# Patient Record
Sex: MALE | Race: WHITE | NOT HISPANIC OR LATINO | Employment: FULL TIME | URBAN - METROPOLITAN AREA
[De-identification: names, ages, dates, MRNs, and addresses within clinical notes are randomized per-mention and may not be internally consistent; named-entity substitution may affect disease eponyms.]

---

## 2023-04-03 ENCOUNTER — OFFICE VISIT (OUTPATIENT)
Dept: PRIMARY CARE | Facility: CLINIC | Age: 33
End: 2023-04-03
Payer: COMMERCIAL

## 2023-04-03 VITALS
SYSTOLIC BLOOD PRESSURE: 132 MMHG | BODY MASS INDEX: 26.2 KG/M2 | DIASTOLIC BLOOD PRESSURE: 74 MMHG | WEIGHT: 183 LBS | TEMPERATURE: 98.6 F | HEIGHT: 70 IN | OXYGEN SATURATION: 97 % | HEART RATE: 91 BPM

## 2023-04-03 DIAGNOSIS — F41.9 ANXIETY: ICD-10-CM

## 2023-04-03 DIAGNOSIS — M94.0 COSTOCHONDRITIS: Primary | ICD-10-CM

## 2023-04-03 PROBLEM — R07.82 INTERCOSTAL PAIN: Status: ACTIVE | Noted: 2023-04-03

## 2023-04-03 PROCEDURE — 99204 OFFICE O/P NEW MOD 45 MIN: CPT | Performed by: STUDENT IN AN ORGANIZED HEALTH CARE EDUCATION/TRAINING PROGRAM

## 2023-04-03 RX ORDER — CYCLOBENZAPRINE HCL 5 MG
5 TABLET ORAL NIGHTLY PRN
Qty: 30 TABLET | Refills: 0 | Status: SHIPPED | OUTPATIENT
Start: 2023-04-03 | End: 2023-04-20 | Stop reason: SDUPTHER

## 2023-04-03 RX ORDER — IBUPROFEN 600 MG/1
TABLET ORAL
COMMUNITY
Start: 2022-08-18 | End: 2023-06-20 | Stop reason: ALTCHOICE

## 2023-04-03 RX ORDER — SERTRALINE HYDROCHLORIDE 25 MG/1
25 TABLET, FILM COATED ORAL DAILY
Qty: 30 TABLET | Refills: 1 | Status: SHIPPED | OUTPATIENT
Start: 2023-04-03 | End: 2023-04-20 | Stop reason: SDUPTHER

## 2023-04-03 SDOH — ECONOMIC STABILITY: TRANSPORTATION INSECURITY
IN THE PAST 12 MONTHS, HAS THE LACK OF TRANSPORTATION KEPT YOU FROM MEDICAL APPOINTMENTS OR FROM GETTING MEDICATIONS?: NO

## 2023-04-03 SDOH — HEALTH STABILITY: PHYSICAL HEALTH: ON AVERAGE, HOW MANY DAYS PER WEEK DO YOU ENGAGE IN MODERATE TO STRENUOUS EXERCISE (LIKE A BRISK WALK)?: 0 DAYS

## 2023-04-03 SDOH — ECONOMIC STABILITY: TRANSPORTATION INSECURITY
IN THE PAST 12 MONTHS, HAS LACK OF TRANSPORTATION KEPT YOU FROM MEETINGS, WORK, OR FROM GETTING THINGS NEEDED FOR DAILY LIVING?: NO

## 2023-04-03 SDOH — HEALTH STABILITY: PHYSICAL HEALTH: ON AVERAGE, HOW MANY MINUTES DO YOU ENGAGE IN EXERCISE AT THIS LEVEL?: 0 MIN

## 2023-04-03 ASSESSMENT — SOCIAL DETERMINANTS OF HEALTH (SDOH)
WITHIN THE LAST YEAR, HAVE YOU BEEN AFRAID OF YOUR PARTNER OR EX-PARTNER?: NO
WITHIN THE LAST YEAR, HAVE YOU BEEN HUMILIATED OR EMOTIONALLY ABUSED IN OTHER WAYS BY YOUR PARTNER OR EX-PARTNER?: NO
ARE YOU MARRIED, WIDOWED, DIVORCED, SEPARATED, NEVER MARRIED, OR LIVING WITH A PARTNER?: NEVER MARRIED
IN A TYPICAL WEEK, HOW MANY TIMES DO YOU TALK ON THE PHONE WITH FAMILY, FRIENDS, OR NEIGHBORS?: MORE THAN THREE TIMES A WEEK
WITHIN THE LAST YEAR, HAVE TO BEEN RAPED OR FORCED TO HAVE ANY KIND OF SEXUAL ACTIVITY BY YOUR PARTNER OR EX-PARTNER?: NO
WITHIN THE LAST YEAR, HAVE YOU BEEN KICKED, HIT, SLAPPED, OR OTHERWISE PHYSICALLY HURT BY YOUR PARTNER OR EX-PARTNER?: NO
DO YOU BELONG TO ANY CLUBS OR ORGANIZATIONS SUCH AS CHURCH GROUPS UNIONS, FRATERNAL OR ATHLETIC GROUPS, OR SCHOOL GROUPS?: NO
HOW OFTEN DO YOU ATTEND CHURCH OR RELIGIOUS SERVICES?: NEVER
HOW OFTEN DO YOU ATTENT MEETINGS OF THE CLUB OR ORGANIZATION YOU BELONG TO?: NEVER
HOW OFTEN DO YOU GET TOGETHER WITH FRIENDS OR RELATIVES?: THREE TIMES A WEEK

## 2023-04-03 ASSESSMENT — ENCOUNTER SYMPTOMS
NECK PAIN: 0
COUGH: 0
SINUS PRESSURE: 0
MYALGIAS: 1
NECK STIFFNESS: 0
JOINT SWELLING: 0
SHORTNESS OF BREATH: 0
DIAPHORESIS: 0
FEVER: 0
HEADACHES: 0
NAUSEA: 0
DIFFICULTY URINATING: 0
PALPITATIONS: 0
NUMBNESS: 0
APNEA: 0
UNEXPECTED WEIGHT CHANGE: 0
FATIGUE: 0
DIZZINESS: 0
SINUS PAIN: 0
VOMITING: 0
CHEST TIGHTNESS: 0
DYSURIA: 0
LIGHT-HEADEDNESS: 0
BLOOD IN STOOL: 0
DIARRHEA: 0

## 2023-04-03 NOTE — PROGRESS NOTES
Subjective   Patient ID: Thad Pardo is a 32 y.o. male who presents for Chest Pain (Patient has been having chest discomfort for about a year. Muscle spasm to chest, left arm and back area. /Seen Cardiologist earlier in the year, had heart ultrasound, stress test, and ct angiogram done results were negative. Goes back to see Dr Rudolph on 04/05/2023.) and Establish Care.    HPI   32 year old male presenting for evaluation of chest pain.    Current symptoms: Left sided chest pain and central chest soreness. Describes it as ripping/tearing with associated burning. Spasms within chest wall, bilateral.    Symptom onset: 1 year  Duration: Always present but during exacerbations lasts for several minutes.    Exacerbated by: Physical activity, sleeping on side, coughing, sneezing, laughing, stretching.  Symptoms present within a few minutes of physical activity. Sometimes the pain is so severe he feels short of breath.    Management: Ice, hot soaks, tylenol/ibuprofen, rest. Recently received a pill for his pain from his cardiologist to take for two weeks but does not remember the name. No resolution of symptoms.    No prior injury history including falls, MVA. Did play contact sports in childhood but no significant injuries.    Symptoms have affected his ability to do physical activities such as running and exercise. Also limits his ability to play with his son.    Has had extensive cardiology evaluation without abnormalities.  No family history of cardiac disease.    Denies dizziness, lightheadedness, syncope. No diaphoresis during exacerbations.    Was previously treated with hydroxyzine as it was believed his symptoms were related to anxiety. Is not currently being treated for anxiety.    Review of Systems   Constitutional:  Negative for diaphoresis, fatigue, fever and unexpected weight change.   HENT:  Negative for sinus pressure and sinus pain.    Eyes:  Negative for visual disturbance.   Respiratory:  Negative  "for apnea, cough, chest tightness and shortness of breath.    Cardiovascular:  Positive for chest pain. Negative for palpitations.   Gastrointestinal:  Negative for blood in stool, diarrhea, nausea and vomiting.   Genitourinary:  Negative for difficulty urinating and dysuria.   Musculoskeletal:  Positive for myalgias. Negative for joint swelling, neck pain and neck stiffness.   Skin:  Negative for rash.   Neurological:  Negative for dizziness, syncope, light-headedness, numbness and headaches.   All other systems reviewed and are negative.      Objective   /74 (BP Location: Left arm, Patient Position: Sitting, BP Cuff Size: Adult)   Pulse 91   Temp 37 °C (98.6 °F) (Temporal)   Ht 1.778 m (5' 10\")   Wt 83 kg (183 lb)   SpO2 97%   BMI 26.26 kg/m²     Physical Exam  Vitals and nursing note reviewed.   Constitutional:       General: He is not in acute distress.     Appearance: Normal appearance. He is not toxic-appearing.   Eyes:      Conjunctiva/sclera: Conjunctivae normal.      Pupils: Pupils are equal, round, and reactive to light.   Cardiovascular:      Rate and Rhythm: Normal rate and regular rhythm.      Pulses: Normal pulses.      Heart sounds: Normal heart sounds.   Pulmonary:      Effort: Pulmonary effort is normal. No respiratory distress.      Breath sounds: Normal breath sounds. No wheezing.   Musculoskeletal:         General: Tenderness (left chest wall tenderness to palpation greatest at midclavicular line just inferior to nipple.) present. No swelling, deformity or signs of injury.      Cervical back: Normal range of motion and neck supple.   Skin:     General: Skin is warm and dry.      Findings: No bruising, lesion or rash.   Neurological:      General: No focal deficit present.      Mental Status: He is alert and oriented to person, place, and time.      Cranial Nerves: No cranial nerve deficit.      Sensory: No sensory deficit.      Motor: No weakness.   Psychiatric:         Mood and " Affect: Mood normal.         Assessment/Plan   Problem List Items Addressed This Visit          Musculoskeletal    Costochondritis - Primary     History and physical examination most consistent with costochondritis.  Patient may continue to take NSAIDs as needed.  Extensive cardiac workup completed and without abnormal findings. Pt is to continue to follow up with cardiology - has appt scheduled.  Will refer to physical therapy for further evaluation and management.  Rx for flexeril sent for muscle spasms - discussed side effects with patient.  Pt to go to ED for any worsening symptoms.  Return to clinic after seen by PT in 4-6 weeks or sooner as needed.         Relevant Medications    cyclobenzaprine (Flexeril) 5 mg tablet    Other Relevant Orders    Referral to Physical Therapy       Other    Anxiety     Will start Sertraline 25mg daily.  Discussed potential side effects.  Follow up in 4-6 weeks or sooner as needed.         Relevant Medications    sertraline (Zoloft) 25 mg tablet     Discussed with attending physician.    Cailin Castellon, DO  PGY3

## 2023-04-03 NOTE — PROGRESS NOTES
I reviewed with the resident the medical history and the resident’s findings on physical examination.  I discussed with the resident the patient’s diagnosis and concur with the treatment plan as documented in the resident note.  Patient has had this longstanding chest pain discomfort.  He had a CT scan, stress test, chest x-ray.  He states his cardiologist he will try him on anxiety meds thinking maybe that contributed to it.  Patient does admit to having anxiety.  No SI or HI thoughts no harmful thoughts himself or others.  Patient will begin physical therapy.  We will try to get all the records.  May need to see pulmonology.  May need MRI.  Patient can follow-up in 2 to 3 weeks.  If not having improvement with some of the pain management to get an MRI of the area.  If the blood work has not been done we will also order complete blood work.  Patient aware if any chest pain shortness of breath any nausea vomiting diarrhea any worsening symptoms any SI or HI thoughts any harmful thoughts to himself or others go to the ER  Side effects medic explained  Follow-up in 2 to 3 weeks  Agree with assessment plan    Jerardo Snow, DO

## 2023-04-03 NOTE — ASSESSMENT & PLAN NOTE
History and physical examination most consistent with costochondritis.  Patient may continue to take NSAIDs as needed.  Extensive cardiac workup completed and without abnormal findings. Pt is to continue to follow up with cardiology - has appt scheduled.  Will refer to physical therapy for further evaluation and management.  Rx for flexeril sent for muscle spasms - discussed side effects with patient.  Pt to go to ED for any worsening symptoms.  Return to clinic after seen by PT in 4-6 weeks or sooner as needed.

## 2023-04-03 NOTE — ASSESSMENT & PLAN NOTE
Will start Sertraline 25mg daily.  Discussed potential side effects.  Follow up in 4-6 weeks or sooner as needed.

## 2023-04-07 ENCOUNTER — TELEPHONE (OUTPATIENT)
Dept: PRIMARY CARE | Facility: CLINIC | Age: 33
End: 2023-04-07
Payer: COMMERCIAL

## 2023-04-20 ENCOUNTER — OFFICE VISIT (OUTPATIENT)
Dept: PRIMARY CARE | Facility: CLINIC | Age: 33
End: 2023-04-20
Payer: COMMERCIAL

## 2023-04-20 VITALS
RESPIRATION RATE: 17 BRPM | SYSTOLIC BLOOD PRESSURE: 122 MMHG | OXYGEN SATURATION: 99 % | TEMPERATURE: 98.7 F | HEART RATE: 96 BPM | DIASTOLIC BLOOD PRESSURE: 82 MMHG | WEIGHT: 184 LBS | BODY MASS INDEX: 26.4 KG/M2

## 2023-04-20 DIAGNOSIS — F41.9 ANXIETY: ICD-10-CM

## 2023-04-20 DIAGNOSIS — M94.0 COSTOCHONDRITIS: ICD-10-CM

## 2023-04-20 PROCEDURE — 99213 OFFICE O/P EST LOW 20 MIN: CPT | Performed by: STUDENT IN AN ORGANIZED HEALTH CARE EDUCATION/TRAINING PROGRAM

## 2023-04-20 RX ORDER — CYCLOBENZAPRINE HCL 5 MG
5 TABLET ORAL NIGHTLY PRN
Qty: 30 TABLET | Refills: 0 | Status: SHIPPED | OUTPATIENT
Start: 2023-04-20 | End: 2023-05-24 | Stop reason: SDUPTHER

## 2023-04-20 RX ORDER — SERTRALINE HYDROCHLORIDE 25 MG/1
25 TABLET, FILM COATED ORAL DAILY
Qty: 30 TABLET | Refills: 1 | Status: SHIPPED | OUTPATIENT
Start: 2023-04-20 | End: 2023-05-24 | Stop reason: SDUPTHER

## 2023-04-20 ASSESSMENT — ENCOUNTER SYMPTOMS
DYSPHORIC MOOD: 0
NAUSEA: 0
PALPITATIONS: 0
DIARRHEA: 0
CHILLS: 0
FEVER: 0
SHORTNESS OF BREATH: 0
VOMITING: 0
CHEST TIGHTNESS: 0
NERVOUS/ANXIOUS: 0

## 2023-04-20 NOTE — ASSESSMENT & PLAN NOTE
Improved from prior visit.   May continue flexeril PRN with plans to come off completely.  Advised patient to continue with gentle exercises and stretching.   May take tylenol / advil as needed.  Follow up if symptoms return.

## 2023-04-20 NOTE — PROGRESS NOTES
Subjective   Patient ID: Thad Pardo is a 32 y.o. male who presents for Follow-up.    HPI     Patient presenting for follow up of costochondritis and anxiety. He was seen approximately 2.5 weeks ago and was started on sertraline and flexeril for his symptoms.    Today, he reports feeling 85% better than his initial visit.  Notes he has been able to start working out again. Has experienced mild soreness in his sternal area but it is not interfering with his daily activities like it was prior. Has been taking flexeril nightly and occasionally during the day. States it has helped significantly with his muscle spasms.    Feels like his moods have improved and states his coworkers have also noticed a difference.  Denies adverse side effects.      Review of Systems   Constitutional:  Negative for chills and fever.   Respiratory:  Negative for chest tightness and shortness of breath.    Cardiovascular:  Negative for chest pain and palpitations.   Gastrointestinal:  Negative for diarrhea, nausea and vomiting.   Psychiatric/Behavioral:  Negative for dysphoric mood. The patient is not nervous/anxious.    All other systems reviewed and are negative.      Objective   /82 (BP Location: Right arm, Patient Position: Sitting, BP Cuff Size: Adult)   Pulse 96   Temp 37.1 °C (98.7 °F)   Resp 17   Wt 83.5 kg (184 lb)   SpO2 99%   BMI 26.40 kg/m²     Physical Exam  Vitals and nursing note reviewed.   Constitutional:       General: He is not in acute distress.     Appearance: Normal appearance. He is not toxic-appearing.   Eyes:      Conjunctiva/sclera: Conjunctivae normal.   Cardiovascular:      Rate and Rhythm: Normal rate and regular rhythm.      Heart sounds: Normal heart sounds.   Pulmonary:      Effort: Pulmonary effort is normal.      Breath sounds: Normal breath sounds.   Chest:      Comments: Mild tenderness to palpation at upper bilateral costosternal junctions.  Skin:     General: Skin is warm and dry.    Neurological:      Mental Status: He is alert.         Assessment/Plan   Problem List Items Addressed This Visit          Musculoskeletal    Costochondritis     Improved from prior visit.   May continue flexeril PRN with plans to come off completely.  Advised patient to continue with gentle exercises and stretching.   May take tylenol / advil as needed.  Follow up if symptoms return.            Other    Anxiety     Patient notes improvement from prior visit since starting sertraline.  Will keep at current dose for now.   Follow up in 4-6 weeks or sooner as needed.           Discussed with attending physician.    Cailin Castellon, DO  PGY3

## 2023-04-20 NOTE — ASSESSMENT & PLAN NOTE
Patient notes improvement from prior visit since starting sertraline.  Will keep at current dose for now.   Follow up in 4-6 weeks or sooner as needed.

## 2023-04-20 NOTE — PROGRESS NOTES
I reviewed with the resident the medical history and the resident’s findings on physical examination.  I discussed with the resident the patient’s diagnosis and concur with the treatment plan as documented in the resident note.     Zoe Bob MD

## 2023-05-24 ENCOUNTER — TELEPHONE (OUTPATIENT)
Dept: PRIMARY CARE | Facility: CLINIC | Age: 33
End: 2023-05-24
Payer: COMMERCIAL

## 2023-05-24 DIAGNOSIS — F41.9 ANXIETY: Primary | ICD-10-CM

## 2023-05-24 DIAGNOSIS — M94.0 COSTOCHONDRITIS: ICD-10-CM

## 2023-05-24 RX ORDER — SERTRALINE HYDROCHLORIDE 25 MG/1
25 TABLET, FILM COATED ORAL DAILY
Qty: 30 TABLET | Refills: 3 | Status: SHIPPED | OUTPATIENT
Start: 2023-05-24 | End: 2023-07-14

## 2023-05-24 RX ORDER — CYCLOBENZAPRINE HCL 5 MG
5 TABLET ORAL NIGHTLY PRN
Qty: 30 TABLET | Refills: 0 | Status: SHIPPED | OUTPATIENT
Start: 2023-05-24 | End: 2023-06-23

## 2023-06-20 ENCOUNTER — OFFICE VISIT (OUTPATIENT)
Dept: PRIMARY CARE | Facility: CLINIC | Age: 33
End: 2023-06-20
Payer: COMMERCIAL

## 2023-06-20 ENCOUNTER — LAB (OUTPATIENT)
Dept: LAB | Facility: LAB | Age: 33
End: 2023-06-20
Payer: COMMERCIAL

## 2023-06-20 VITALS
SYSTOLIC BLOOD PRESSURE: 124 MMHG | HEART RATE: 83 BPM | DIASTOLIC BLOOD PRESSURE: 73 MMHG | WEIGHT: 185 LBS | BODY MASS INDEX: 26.54 KG/M2 | RESPIRATION RATE: 18 BRPM | TEMPERATURE: 98.1 F | OXYGEN SATURATION: 98 %

## 2023-06-20 DIAGNOSIS — M94.0 COSTOCHONDRITIS: Primary | ICD-10-CM

## 2023-06-20 DIAGNOSIS — M25.50 POLYARTHRALGIA: ICD-10-CM

## 2023-06-20 DIAGNOSIS — G89.29 CHRONIC BILATERAL LOW BACK PAIN WITHOUT SCIATICA: ICD-10-CM

## 2023-06-20 DIAGNOSIS — M54.50 CHRONIC BILATERAL LOW BACK PAIN WITHOUT SCIATICA: ICD-10-CM

## 2023-06-20 DIAGNOSIS — M54.2 NECK PAIN: ICD-10-CM

## 2023-06-20 DIAGNOSIS — R53.83 OTHER FATIGUE: ICD-10-CM

## 2023-06-20 DIAGNOSIS — R07.89 STERNUM PAIN: ICD-10-CM

## 2023-06-20 DIAGNOSIS — M54.6 BILATERAL THORACIC BACK PAIN, UNSPECIFIED CHRONICITY: ICD-10-CM

## 2023-06-20 LAB
ALANINE AMINOTRANSFERASE (SGPT) (U/L) IN SER/PLAS: 17 U/L (ref 10–52)
ALBUMIN (G/DL) IN SER/PLAS: 4.8 G/DL (ref 3.4–5)
ALKALINE PHOSPHATASE (U/L) IN SER/PLAS: 44 U/L (ref 33–120)
ANION GAP IN SER/PLAS: 11 MMOL/L (ref 10–20)
ASPARTATE AMINOTRANSFERASE (SGOT) (U/L) IN SER/PLAS: 15 U/L (ref 9–39)
BILIRUBIN TOTAL (MG/DL) IN SER/PLAS: 0.4 MG/DL (ref 0–1.2)
C REACTIVE PROTEIN (MG/L) IN SER/PLAS: <0.1 MG/DL
CALCIDIOL (25 OH VITAMIN D3) (NG/ML) IN SER/PLAS: 19 NG/ML
CALCIUM (MG/DL) IN SER/PLAS: 10 MG/DL (ref 8.6–10.3)
CARBON DIOXIDE, TOTAL (MMOL/L) IN SER/PLAS: 29 MMOL/L (ref 21–32)
CHLORIDE (MMOL/L) IN SER/PLAS: 100 MMOL/L (ref 98–107)
COBALAMIN (VITAMIN B12) (PG/ML) IN SER/PLAS: 408 PG/ML (ref 211–911)
CREATININE (MG/DL) IN SER/PLAS: 1.03 MG/DL (ref 0.5–1.3)
ERYTHROCYTE DISTRIBUTION WIDTH (RATIO) BY AUTOMATED COUNT: 11.5 % (ref 11.5–14.5)
ERYTHROCYTE MEAN CORPUSCULAR HEMOGLOBIN CONCENTRATION (G/DL) BY AUTOMATED: 33 G/DL (ref 32–36)
ERYTHROCYTE MEAN CORPUSCULAR VOLUME (FL) BY AUTOMATED COUNT: 87 FL (ref 80–100)
ERYTHROCYTES (10*6/UL) IN BLOOD BY AUTOMATED COUNT: 5.09 X10E12/L (ref 4.5–5.9)
GFR MALE: >90 ML/MIN/1.73M2
GLUCOSE (MG/DL) IN SER/PLAS: 87 MG/DL (ref 74–99)
HEMATOCRIT (%) IN BLOOD BY AUTOMATED COUNT: 44.5 % (ref 41–52)
HEMOGLOBIN (G/DL) IN BLOOD: 14.7 G/DL (ref 13.5–17.5)
LEUKOCYTES (10*3/UL) IN BLOOD BY AUTOMATED COUNT: 7.5 X10E9/L (ref 4.4–11.3)
PLATELETS (10*3/UL) IN BLOOD AUTOMATED COUNT: 224 X10E9/L (ref 150–450)
POTASSIUM (MMOL/L) IN SER/PLAS: 4.3 MMOL/L (ref 3.5–5.3)
PROTEIN TOTAL: 7.8 G/DL (ref 6.4–8.2)
RHEUMATOID FACTOR (IU/ML) IN SERUM OR PLASMA: <10 IU/ML (ref 0–15)
SEDIMENTATION RATE, ERYTHROCYTE: 5 MM/H (ref 0–15)
SODIUM (MMOL/L) IN SER/PLAS: 136 MMOL/L (ref 136–145)
THYROTROPIN (MIU/L) IN SER/PLAS BY DETECTION LIMIT <= 0.05 MIU/L: 0.92 MIU/L (ref 0.44–3.98)
UREA NITROGEN (MG/DL) IN SER/PLAS: 17 MG/DL (ref 6–23)

## 2023-06-20 PROCEDURE — 86200 CCP ANTIBODY: CPT

## 2023-06-20 PROCEDURE — 93000 ELECTROCARDIOGRAM COMPLETE: CPT | Performed by: STUDENT IN AN ORGANIZED HEALTH CARE EDUCATION/TRAINING PROGRAM

## 2023-06-20 PROCEDURE — 82607 VITAMIN B-12: CPT

## 2023-06-20 PROCEDURE — 85652 RBC SED RATE AUTOMATED: CPT

## 2023-06-20 PROCEDURE — 84443 ASSAY THYROID STIM HORMONE: CPT

## 2023-06-20 PROCEDURE — 80053 COMPREHEN METABOLIC PANEL: CPT

## 2023-06-20 PROCEDURE — 85027 COMPLETE CBC AUTOMATED: CPT

## 2023-06-20 PROCEDURE — 36415 COLL VENOUS BLD VENIPUNCTURE: CPT

## 2023-06-20 PROCEDURE — 86140 C-REACTIVE PROTEIN: CPT

## 2023-06-20 PROCEDURE — 99214 OFFICE O/P EST MOD 30 MIN: CPT | Performed by: STUDENT IN AN ORGANIZED HEALTH CARE EDUCATION/TRAINING PROGRAM

## 2023-06-20 PROCEDURE — 86038 ANTINUCLEAR ANTIBODIES: CPT

## 2023-06-20 PROCEDURE — 82306 VITAMIN D 25 HYDROXY: CPT

## 2023-06-20 PROCEDURE — 86431 RHEUMATOID FACTOR QUANT: CPT

## 2023-06-20 PROCEDURE — 81381 HLA I TYPING 1 ALLELE HR: CPT

## 2023-06-20 ASSESSMENT — ENCOUNTER SYMPTOMS
ABDOMINAL PAIN: 0
DIZZINESS: 0
ARTHRALGIAS: 1
FEVER: 0
CHILLS: 0
COUGH: 0
RHINORRHEA: 0
HEADACHES: 1
MYALGIAS: 1
SHORTNESS OF BREATH: 0

## 2023-06-20 NOTE — PROGRESS NOTES
"Subjective   Patient ID: Thad Pardo is a 32 y.o. male who presents for Chest Pain (Chest pain, feels his sternum is sore and pops.C/O muscle pain and HA.).    HPI   Coming for recurrent chest pain and aches and pains.  Sharp pains in his side improved with zoloft.   Also taking PRN flexeril.  Sternum sore and pops daily. Does feel some improvement with ice.  Previously followed up with cardiology - per pt was told not cardiac nature.  Denies red flag symptoms.    Has been having more joint pain. Shoulders, upper and lower back.  Headaches.  Drinks lots of water and eats regular meals.  Sleep is \"decent.\" Thinks he sleeps 6-8h. Wakes up frequently and difficult to fall back asleep. Unsure if he snores. Does not wake up gasping for air. Is having daytime fatigue.    Works at [employer] working on a [employer].    Denies FHX autoimmune disease  April 2023 CRP, ESR, WBC wnl.    Review of Systems   Constitutional:  Negative for chills and fever.   HENT:  Negative for congestion and rhinorrhea.    Eyes:  Negative for visual disturbance.   Respiratory:  Negative for cough and shortness of breath.    Cardiovascular:  Negative for chest pain.   Gastrointestinal:  Negative for abdominal pain.   Musculoskeletal:  Positive for arthralgias and myalgias.   Neurological:  Positive for headaches. Negative for dizziness.       Objective   /73 (BP Location: Right arm, Patient Position: Sitting)   Pulse 83   Temp 36.7 °C (98.1 °F)   Resp 18   Wt 83.9 kg (185 lb)   SpO2 98%   BMI 26.54 kg/m²     Physical Exam  Vitals and nursing note reviewed.   Constitutional:       General: He is not in acute distress.     Appearance: Normal appearance.   HENT:      Head: Normocephalic and atraumatic.   Eyes:      Extraocular Movements: Extraocular movements intact.      Pupils: Pupils are equal, round, and reactive to light.   Cardiovascular:      Rate and Rhythm: Normal rate and regular rhythm.      Heart sounds: Normal heart sounds. " "  Pulmonary:      Effort: Pulmonary effort is normal. No respiratory distress.      Breath sounds: Normal breath sounds. No wheezing, rhonchi or rales.   Chest:      Chest wall: Tenderness present.   Abdominal:      General: Abdomen is flat.      Palpations: Abdomen is soft.   Musculoskeletal:         General: Normal range of motion.      Cervical back: Normal range of motion. No rigidity.      Right lower leg: No edema.      Left lower leg: No edema.      Comments: No point tenderness. Strength, sensation, ROM intact.   Skin:     General: Skin is warm and dry.   Neurological:      General: No focal deficit present.      Mental Status: He is alert and oriented to person, place, and time. Mental status is at baseline.      Cranial Nerves: No cranial nerve deficit.      Sensory: No sensory deficit.      Motor: No weakness.      Gait: Gait normal.   Psychiatric:         Mood and Affect: Mood normal.         Behavior: Behavior normal.         Thought Content: Thought content normal.       This is a 32-year-old male with no significant past medical history who has been seen multiple times in this office for costochondritis.  He has also been seen by cardiology with a negative work-up for the his chest pain.  Given that this chest pain is persistent despite more conservative treatment, additional work-up seems appropriate at this time.  He does have additional concerns of spinal discomfort, polyarthralgia, and sternum \"popping.\"  We will obtain autoimmune work-up and get imaging of his cervical, thoracic, lumbar spine as well as sternum.  Will call with results.  To follow-up in office in 2 weeks to discuss results further.  I suspect this is MSK in origin, but given his constellation of symptoms and persistent discomfort, we will rule out any alternative causes for his back pain and polyarthralgia, such as AS.  PT referral placed, as well.  I stressed the importance of following up with physical therapy during this " appointment.   Assessment/Plan   Diagnoses and all orders for this visit:  Chronic bilateral low back pain without sciatica  -     Referral to Physical Therapy; Future  -     XR lumbar spine 4+ views w flexion extension; Future  -     HLAB27 Typing; Future  Costochondritis  -     Referral to Physical Therapy; Future  -     ECG 12 lead (Clinic Performed)  Bilateral thoracic back pain, unspecified chronicity  -     Referral to Physical Therapy; Future  -     XR thoracic spine 3 views; Future  Polyarthralgia  -     Referral to Physical Therapy; Future  -     Comprehensive Metabolic Panel; Future  -     CBC; Future  -     TSH with reflex to Free T4 if abnormal; Future  -     ELIESER with Reflex to KHAI; Future  -     Citrulline Antibody, IgG; Future  -     C-Reactive Protein; Future  -     Rheumatoid Factor; Future  -     Sedimentation Rate; Future  -     Vitamin B12; Future  -     Vitamin D 25-Hydroxy,Total; Future  -     HLAB27 Typing; Future  Other fatigue  -     Comprehensive Metabolic Panel; Future  -     CBC; Future  -     TSH with reflex to Free T4 if abnormal; Future  -     ELIESER with Reflex to KHAI; Future  -     Citrulline Antibody, IgG; Future  -     C-Reactive Protein; Future  -     Rheumatoid Factor; Future  -     Sedimentation Rate; Future  -     Vitamin B12; Future  -     Vitamin D 25-Hydroxy,Total; Future  -     HLAB27 Typing; Future  Neck pain  -     XR cervical spine complete 4-5 views  Sternum pain  -     XR sternum 2+ views; Future

## 2023-06-21 LAB
ANTI-NUCLEAR ANTIBODY (ANA): NEGATIVE
CITRULLINE ANTIBODY, IGG: <1 U/ML

## 2023-06-21 NOTE — PROGRESS NOTES
I saw and evaluated the patient. I personally obtained the key and critical portions of the history and physical exam or was physically present for key and critical portions performed by the resident/fellow. I reviewed the resident/fellow's documentation and discussed the patient with the resident/fellow. I agree with the resident/fellow's medical decision making as documented in the note.    Jordan Brady, DO

## 2023-06-22 NOTE — RESULT ENCOUNTER NOTE
Reviewed lab and imaging results with patient on the phone- vitamin d deficient. Will send script for vitamin d.   Reinforced that PT may significantly improve his costochondritis. But will refer to sport medicine, per patient request.   Still waiting on HLAB27 results.

## 2023-06-27 ENCOUNTER — OFFICE VISIT (OUTPATIENT)
Dept: PRIMARY CARE | Facility: CLINIC | Age: 33
End: 2023-06-27
Payer: COMMERCIAL

## 2023-06-27 VITALS
HEART RATE: 100 BPM | SYSTOLIC BLOOD PRESSURE: 133 MMHG | DIASTOLIC BLOOD PRESSURE: 72 MMHG | WEIGHT: 185 LBS | RESPIRATION RATE: 18 BRPM | TEMPERATURE: 98.3 F | BODY MASS INDEX: 26.54 KG/M2 | OXYGEN SATURATION: 98 %

## 2023-06-27 DIAGNOSIS — R14.1 GAS PAIN: ICD-10-CM

## 2023-06-27 DIAGNOSIS — K59.00 CONSTIPATION, UNSPECIFIED CONSTIPATION TYPE: Primary | ICD-10-CM

## 2023-06-27 PROCEDURE — 99213 OFFICE O/P EST LOW 20 MIN: CPT | Performed by: STUDENT IN AN ORGANIZED HEALTH CARE EDUCATION/TRAINING PROGRAM

## 2023-06-27 RX ORDER — SIMETHICONE 125 MG
TABLET,CHEWABLE ORAL
Qty: 60 TABLET | Refills: 0 | Status: SHIPPED | OUTPATIENT
Start: 2023-06-27 | End: 2023-07-14 | Stop reason: ALTCHOICE

## 2023-06-27 RX ORDER — POLYETHYLENE GLYCOL 3350 17 G/17G
17 POWDER, FOR SOLUTION ORAL DAILY
Qty: 90 PACKET | Refills: 1 | Status: SHIPPED | OUTPATIENT
Start: 2023-06-27 | End: 2023-07-14 | Stop reason: ALTCHOICE

## 2023-06-27 ASSESSMENT — ENCOUNTER SYMPTOMS
FEVER: 0
CONSTIPATION: 1
CHILLS: 0
NAUSEA: 0
COUGH: 0
SHORTNESS OF BREATH: 0
RECTAL PAIN: 0
ABDOMINAL DISTENTION: 1
HEADACHES: 0
DIARRHEA: 0
DIFFICULTY URINATING: 0
ABDOMINAL PAIN: 1
VOMITING: 0
HEMATURIA: 0
DIZZINESS: 0
ANAL BLEEDING: 0
BLOOD IN STOOL: 0
RHINORRHEA: 0

## 2023-06-27 NOTE — PROGRESS NOTES
"Subjective   Patient ID: Thad Pardo is a 32 y.o. male who presents for Abdominal Cramping (Abdominal cramping around belly button on/off pain.).    HPI   Here for \"knot\" behind his belly button intermittently for the past few weeks.   +constipation, bloating  Last BM today - straining, small pellet like BMs.  Has not tried any medication for constipation.  Will get cramping sensation roughly once a week.  Associates pain with eating 2 days ago. Pain will stay for an hour or two when he does experience it.   Denies nausea, vomiting, blood in stool or urine.     Does eat fruits and vegetables.  Drinks plenty of water.    Still having his back and rib pain -  hs appt with PT 7/12. Waiting on HLAb27 results.     Review of Systems   Constitutional:  Negative for chills and fever.   HENT:  Negative for congestion and rhinorrhea.    Eyes:  Negative for visual disturbance.   Respiratory:  Negative for cough and shortness of breath.    Cardiovascular:  Negative for chest pain.   Gastrointestinal:  Positive for abdominal distention, abdominal pain and constipation. Negative for anal bleeding, blood in stool, diarrhea, nausea, rectal pain and vomiting.   Genitourinary:  Negative for difficulty urinating and hematuria.   Neurological:  Negative for dizziness and headaches.       Objective   /72 (BP Location: Right arm, Patient Position: Sitting)   Pulse 100   Temp 36.8 °C (98.3 °F)   Resp 18   Wt 83.9 kg (185 lb)   SpO2 98%   BMI 26.54 kg/m²     Physical Exam  Vitals and nursing note reviewed.   Constitutional:       General: He is not in acute distress.     Appearance: Normal appearance.   HENT:      Head: Normocephalic and atraumatic.   Eyes:      Extraocular Movements: Extraocular movements intact.   Cardiovascular:      Rate and Rhythm: Normal rate and regular rhythm.      Heart sounds: Normal heart sounds.   Pulmonary:      Effort: Pulmonary effort is normal. No respiratory distress.      Breath sounds: " Normal breath sounds.   Abdominal:      General: Abdomen is flat. Bowel sounds are increased.      Palpations: Abdomen is soft. There is no hepatomegaly or mass.      Tenderness: There is no abdominal tenderness. There is no guarding or rebound. Negative signs include Bahena's sign, Rovsing's sign and McBurney's sign.      Hernia: No hernia is present.   Musculoskeletal:         General: Normal range of motion.   Skin:     General: Skin is warm and dry.   Neurological:      General: No focal deficit present.      Mental Status: He is alert. Mental status is at baseline.      Gait: Gait normal.   Psychiatric:         Mood and Affect: Mood normal.         Behavior: Behavior normal.         Thought Content: Thought content normal.       32-year-old male presenting for intermittent periumbilical abdominal pain associated with bloating, constipation, and eating for the past 3 weeks.  Physical exam unremarkable with soft abdomen.  Reviewed imaging from 1 week ago which did show some stool in the colon and some gas in the bowel.  Suspect that he may be having some constipation and gas pain.  Will trial simethicone for gas and daily MiraLAX to help with constipation.  Gave patient information on constipation with dietary recommendations.  To follow-up in 2-4 weeks if no improvement in symptoms.    Assessment/Plan   Diagnoses and all orders for this visit:  Constipation, unspecified constipation type  -     polyethylene glycol (Glycolax) 17 gram packet; Take 17 g by mouth once daily. Mix 1 cap (17g) into 8 ounces of fluid.  Gas pain  -     simethicone (Gas-Ex) 125 mg tablet tablet; Take 1 tablet (125 mg) by mouth 4 times a day as needed (For gas).

## 2023-06-28 LAB — HLAB27 TYPING: NEGATIVE

## 2023-06-28 NOTE — PROGRESS NOTES
I reviewed with the resident the medical history and the resident’s findings on physical examination.  I discussed with the resident the patient’s diagnosis and concur with the treatment plan as documented in the resident note.     Jordan Brady, DO

## 2023-07-14 ENCOUNTER — OFFICE VISIT (OUTPATIENT)
Dept: PRIMARY CARE | Facility: CLINIC | Age: 33
End: 2023-07-14
Payer: COMMERCIAL

## 2023-07-14 VITALS
BODY MASS INDEX: 25.34 KG/M2 | SYSTOLIC BLOOD PRESSURE: 157 MMHG | DIASTOLIC BLOOD PRESSURE: 98 MMHG | HEART RATE: 100 BPM | OXYGEN SATURATION: 98 % | RESPIRATION RATE: 18 BRPM | TEMPERATURE: 98 F | HEIGHT: 70 IN | WEIGHT: 177 LBS

## 2023-07-14 DIAGNOSIS — F41.9 ANXIETY: ICD-10-CM

## 2023-07-14 DIAGNOSIS — F17.200 TOBACCO USE DISORDER: ICD-10-CM

## 2023-07-14 DIAGNOSIS — M94.0 COSTOCHONDRITIS: ICD-10-CM

## 2023-07-14 DIAGNOSIS — K59.00 CONSTIPATION, UNSPECIFIED CONSTIPATION TYPE: Primary | ICD-10-CM

## 2023-07-14 PROCEDURE — 99213 OFFICE O/P EST LOW 20 MIN: CPT

## 2023-07-14 RX ORDER — CYCLOBENZAPRINE HCL 5 MG
5 TABLET ORAL NIGHTLY PRN
Qty: 30 TABLET | Refills: 0 | Status: SHIPPED | OUTPATIENT
Start: 2023-07-14 | End: 2023-09-12

## 2023-07-14 RX ORDER — MELOXICAM 7.5 MG/1
7.5 TABLET ORAL DAILY
Qty: 30 TABLET | Refills: 11 | Status: SHIPPED | OUTPATIENT
Start: 2023-07-14 | End: 2024-02-23

## 2023-07-14 RX ORDER — POLYETHYLENE GLYCOL 3350 17 G/17G
17 POWDER, FOR SOLUTION ORAL DAILY
Qty: 30 PACKET | Refills: 0 | Status: SHIPPED | OUTPATIENT
Start: 2023-07-14 | End: 2023-07-21 | Stop reason: ALTCHOICE

## 2023-07-14 RX ORDER — SERTRALINE HYDROCHLORIDE 25 MG/1
TABLET, FILM COATED ORAL
Qty: 90 TABLET | Refills: 1 | Status: SHIPPED | OUTPATIENT
Start: 2023-07-14 | End: 2024-01-04 | Stop reason: SDUPTHER

## 2023-07-14 ASSESSMENT — ENCOUNTER SYMPTOMS
CONSTIPATION: 1
CHILLS: 0
AGITATION: 0
CHEST TIGHTNESS: 0
SINUS PAIN: 0
APPETITE CHANGE: 0
NUMBNESS: 0
ARTHRALGIAS: 0
DIARRHEA: 0
HEADACHES: 0
FACIAL ASYMMETRY: 0
FREQUENCY: 0
SHORTNESS OF BREATH: 0
ABDOMINAL PAIN: 1
SLEEP DISTURBANCE: 0
FATIGUE: 0
ACTIVITY CHANGE: 0
FEVER: 0
COUGH: 0
EYE DISCHARGE: 0
CHOKING: 0
MYALGIAS: 0
LIGHT-HEADEDNESS: 0
DYSURIA: 0
WOUND: 0
BACK PAIN: 0
ABDOMINAL DISTENTION: 0
HEMATURIA: 0
DIFFICULTY URINATING: 0
POLYPHAGIA: 0
EYE ITCHING: 0
VOMITING: 0
WHEEZING: 0
DIZZINESS: 0
RHINORRHEA: 0
EYE PAIN: 0
SORE THROAT: 0
POLYDIPSIA: 0
NAUSEA: 0
SEIZURES: 0
PALPITATIONS: 0
SINUS PRESSURE: 0
STRIDOR: 0
EYE REDNESS: 0

## 2023-07-14 NOTE — ASSESSMENT & PLAN NOTE
-Will prescribe 2 weeks of meloxicam and PRN flexeril  -Reiterated the need for physical therapy as pt will likely benefit

## 2023-07-14 NOTE — PROGRESS NOTES
Subjective   Patient ID: [id number] 1990   Thad Pardo is a 32 y.o. male who presents for Follow-up (Follow up on constipation/diarrhea. C/o stomach pain and bloating./Also is asking for mucle relaxers for his sternum discomfort. ).  Follow up from three weeks ago with Dr. Tobias. Still having trouble with bowel movements - very constipated, small pebble-like stools once daily. Started miralax about a week ago - 1 capful daily with no improvement.  2 weeks since last normal bowel movement. He also tried milk of magnesia once, not taking simethicone. He also reports bright green stools a few months ago and wondering if this is related, none currently. He is passing gas, denies vomiting, fever/chills, or blood in the stool. He also denies history of abdominal surgeries.     Diet: protein heavy, eggs, drinks a lot of water (1 gallon daily), trying to increase fiber and green veggies     Also has chronic chest wall pain. He describes this as constant, worse with activity and movement, sneezing. This restricts from daily activity and has been flared over the past week. He was supposed to go to PT but didn't secondary to abdominal bloating and discomfort. Has had negative stress test, echo, imaging, and EKG.          Review of Systems   Constitutional:  Negative for activity change, appetite change, chills, fatigue and fever.   HENT:  Negative for congestion, dental problem, ear pain, mouth sores, postnasal drip, rhinorrhea, sinus pressure, sinus pain, sneezing and sore throat.    Eyes:  Negative for pain, discharge, redness and itching.   Respiratory:  Negative for cough, choking, chest tightness, shortness of breath, wheezing and stridor.    Cardiovascular:  Positive for chest pain. Negative for palpitations and leg swelling.   Gastrointestinal:  Positive for abdominal pain and constipation. Negative for abdominal distention, diarrhea, nausea and vomiting.   Endocrine: Negative for polydipsia, polyphagia and  "polyuria.   Genitourinary:  Negative for decreased urine volume, difficulty urinating, dysuria, enuresis, frequency, hematuria and urgency.   Musculoskeletal:  Negative for arthralgias, back pain and myalgias.   Skin:  Negative for pallor, rash and wound.   Neurological:  Negative for dizziness, seizures, syncope, facial asymmetry, light-headedness, numbness and headaches.   Psychiatric/Behavioral:  Negative for agitation, behavioral problems, sleep disturbance and suicidal ideas.        Objective   BP (!) 157/98 (BP Location: Right arm, Patient Position: Sitting)   Pulse 100   Temp 36.7 °C (98 °F)   Resp 18   Ht 1.778 m (5' 10\")   Wt 80.3 kg (177 lb)   SpO2 98%   BMI 25.40 kg/m²    Physical Exam  Vitals and nursing note reviewed.   Constitutional:       General: He is not in acute distress.     Appearance: Normal appearance. He is not toxic-appearing.   HENT:      Head: Normocephalic and atraumatic.      Right Ear: External ear normal.      Left Ear: External ear normal.      Nose: Nose normal.      Mouth/Throat:      Mouth: Mucous membranes are moist.   Cardiovascular:      Rate and Rhythm: Normal rate and regular rhythm.      Pulses: Normal pulses.      Heart sounds: Normal heart sounds. No murmur heard.     No friction rub. No gallop.   Pulmonary:      Effort: Pulmonary effort is normal. No respiratory distress.      Breath sounds: Normal breath sounds. No stridor. No wheezing, rhonchi or rales.   Abdominal:      General: Abdomen is flat. Bowel sounds are normal. There is no distension.      Palpations: Abdomen is soft. There is no mass.      Tenderness: There is generalized abdominal tenderness. There is no right CVA tenderness, left CVA tenderness, guarding or rebound.      Hernia: No hernia is present.   Musculoskeletal:         General: No swelling, deformity or signs of injury. Normal range of motion.      Cervical back: Normal range of motion and neck supple. No rigidity.      Right lower leg: No " edema.      Left lower leg: No edema.      Comments: Tenderness to palpation of sternum   Lymphadenopathy:      Cervical: No cervical adenopathy.   Skin:     General: Skin is warm and dry.      Capillary Refill: Capillary refill takes less than 2 seconds.      Findings: No rash.   Neurological:      General: No focal deficit present.      Mental Status: He is alert and oriented to person, place, and time. Mental status is at baseline.      Cranial Nerves: No cranial nerve deficit.      Sensory: No sensory deficit.      Motor: No weakness.      Coordination: Coordination normal.   Psychiatric:         Mood and Affect: Mood normal.         Behavior: Behavior normal.         Assessment/Plan   Problem List Items Addressed This Visit       Constipation - Primary     -Miralax 4 caps today and then resume 1 cap daily   -Pt instructed to call if not improving on Monday, will consider adding Senna  -Referral to GI given sudden change in bowel habits for consideration for colonoscopy            Relevant Medications    polyethylene glycol (Glycolax, Miralax) 17 gram packet    Other Relevant Orders    Referral to Gastroenterology    Costochondritis     -Will prescribe 2 weeks of meloxicam and PRN flexeril  -Reiterated the need for physical therapy as pt will likely benefit         Relevant Medications    meloxicam (Mobic) 7.5 mg tablet    cyclobenzaprine (Flexeril) 5 mg tablet    Tobacco use disorder     -Discussed the importance of quitting  -Offered patient pharmacologic options including nicotine replacement, varenicline, and bupropion. Pt declines.   -Pt states he is not ready to quit               Discussed with attending physician Dr. Rai.     Gordy Moralez DO

## 2023-07-14 NOTE — PROGRESS NOTES
I reviewed with the resident the medical history and the resident’s findings on physical examination.  I discussed with the resident the patient’s diagnosis and concur with the treatment plan as documented in the resident note.     Ty Rai MD

## 2023-07-14 NOTE — PATIENT INSTRUCTIONS
Jorgito lemsu to see you today.     Take 4 caps of miralax today, then return to one cap of miralax a day. If this doesn't work by Monday, give us a call and we can add a medication.     Please follow up with GI.     I wrote prescriptions for an anti-inflammatory and muscle relaxer for costochondritis. Please go to physical therapy as this will help you out a great deal.

## 2023-07-14 NOTE — ASSESSMENT & PLAN NOTE
-Discussed the importance of quitting  -Offered patient pharmacologic options including nicotine replacement, varenicline, and bupropion. Pt declines.   -Pt states he is not ready to quit

## 2023-07-21 ENCOUNTER — OFFICE VISIT (OUTPATIENT)
Dept: PRIMARY CARE | Facility: CLINIC | Age: 33
End: 2023-07-21
Payer: COMMERCIAL

## 2023-07-21 VITALS
OXYGEN SATURATION: 96 % | WEIGHT: 177 LBS | SYSTOLIC BLOOD PRESSURE: 128 MMHG | BODY MASS INDEX: 25.34 KG/M2 | TEMPERATURE: 98.9 F | HEART RATE: 91 BPM | RESPIRATION RATE: 16 BRPM | DIASTOLIC BLOOD PRESSURE: 84 MMHG | HEIGHT: 70 IN

## 2023-07-21 DIAGNOSIS — K59.00 CONSTIPATION, UNSPECIFIED CONSTIPATION TYPE: ICD-10-CM

## 2023-07-21 DIAGNOSIS — L23.7 POISON IVY DERMATITIS: Primary | ICD-10-CM

## 2023-07-21 PROCEDURE — 99212 OFFICE O/P EST SF 10 MIN: CPT

## 2023-07-21 RX ORDER — CLOBETASOL PROPIONATE 0.5 MG/G
CREAM TOPICAL 2 TIMES DAILY
Qty: 15 G | Refills: 0 | Status: SHIPPED | OUTPATIENT
Start: 2023-07-21 | End: 2023-07-28

## 2023-07-21 RX ORDER — POLYETHYLENE GLYCOL 3350 17 G/17G
17 POWDER, FOR SOLUTION ORAL DAILY
Qty: 100 PACKET | Refills: 0 | Status: SHIPPED | OUTPATIENT
Start: 2023-07-21 | End: 2023-07-24

## 2023-07-21 ASSESSMENT — ENCOUNTER SYMPTOMS
SEIZURES: 0
CONSTIPATION: 1
ABDOMINAL PAIN: 0
FACIAL ASYMMETRY: 0
SORE THROAT: 0
FEVER: 0
CHEST TIGHTNESS: 0
LIGHT-HEADEDNESS: 0
COUGH: 0
ARTHRALGIAS: 0
POLYPHAGIA: 0
SINUS PRESSURE: 0
STRIDOR: 0
VOMITING: 0
CHILLS: 0
HEADACHES: 0
WOUND: 0
ABDOMINAL DISTENTION: 0
DIARRHEA: 0
EYE DISCHARGE: 0
WHEEZING: 0
BACK PAIN: 0
NAUSEA: 0
SINUS PAIN: 0
FREQUENCY: 0
EYE PAIN: 0
EYE ITCHING: 0
POLYDIPSIA: 0
APPETITE CHANGE: 0
MYALGIAS: 0
NUMBNESS: 0
RHINORRHEA: 0
EYE REDNESS: 0
SLEEP DISTURBANCE: 0
CHOKING: 0
SHORTNESS OF BREATH: 0
DIFFICULTY URINATING: 0
FATIGUE: 0
PALPITATIONS: 0
DYSURIA: 0
ACTIVITY CHANGE: 0
DIZZINESS: 0
AGITATION: 0

## 2023-07-21 NOTE — PROGRESS NOTES
Subjective   Patient ID: [id number] 1990   Thad Pardo is a 32 y.o. male who presents for Constipation (1 week follow up for Constipation. He states it is the same.).  Pt states that he is still constipated. He did miralax cleanout last week and had one episode of diarrhea. Continued with miralax daily but is having one episode of loose stool per day but feels he is still constipated, having small pellet-like stools. He denies vomiting, abdominal pain, or fever. Bowel habits were normal prior to the past month.     Also has rash to left lower leg and left elbow for 1 week. Pt states that he was hiking in the woods and fishing prior to this. Rash is extremely itchy. He tried to put eczema lotion on it but this didn't help.         Review of Systems   Constitutional:  Negative for activity change, appetite change, chills, fatigue and fever.   HENT:  Negative for congestion, dental problem, ear pain, mouth sores, postnasal drip, rhinorrhea, sinus pressure, sinus pain, sneezing and sore throat.    Eyes:  Negative for pain, discharge, redness and itching.   Respiratory:  Negative for cough, choking, chest tightness, shortness of breath, wheezing and stridor.    Cardiovascular:  Negative for chest pain, palpitations and leg swelling.   Gastrointestinal:  Positive for constipation. Negative for abdominal distention, abdominal pain, diarrhea, nausea and vomiting.   Endocrine: Negative for polydipsia, polyphagia and polyuria.   Genitourinary:  Negative for decreased urine volume, difficulty urinating, dysuria, enuresis, frequency and urgency.   Musculoskeletal:  Negative for arthralgias, back pain and myalgias.   Skin:  Positive for rash. Negative for pallor and wound.   Neurological:  Negative for dizziness, seizures, syncope, facial asymmetry, light-headedness, numbness and headaches.   Psychiatric/Behavioral:  Negative for agitation, behavioral problems, sleep disturbance and suicidal ideas.        Objective   BP  "128/84 (BP Location: Right arm, Patient Position: Sitting)   Pulse 91   Temp 37.2 °C (98.9 °F)   Resp 16   Ht 1.778 m (5' 10\")   Wt 80.3 kg (177 lb)   SpO2 96%   BMI 25.40 kg/m²    Physical Exam  Vitals and nursing note reviewed.   Constitutional:       General: He is not in acute distress.     Appearance: Normal appearance. He is not toxic-appearing. Diaphoretic: constipation.  HENT:      Head: Normocephalic and atraumatic.      Right Ear: External ear normal.      Left Ear: External ear normal.      Nose: Nose normal.      Mouth/Throat:      Mouth: Mucous membranes are moist.      Pharynx: No oropharyngeal exudate or posterior oropharyngeal erythema.   Eyes:      General: No scleral icterus.     Extraocular Movements: Extraocular movements intact.      Pupils: Pupils are equal, round, and reactive to light.   Cardiovascular:      Rate and Rhythm: Normal rate and regular rhythm.      Pulses: Normal pulses.      Heart sounds: Normal heart sounds. No murmur heard.     No friction rub. No gallop.   Pulmonary:      Effort: Pulmonary effort is normal. No respiratory distress.      Breath sounds: Normal breath sounds. No stridor. No wheezing, rhonchi or rales.   Abdominal:      General: Abdomen is flat. Bowel sounds are normal. There is no distension.      Palpations: Abdomen is soft. There is no mass.      Tenderness: There is no abdominal tenderness. There is no right CVA tenderness, left CVA tenderness, guarding or rebound.      Hernia: No hernia is present.   Musculoskeletal:         General: No swelling, deformity or signs of injury. Normal range of motion.      Cervical back: Normal range of motion and neck supple. No rigidity.      Right lower leg: No edema.      Left lower leg: No edema.   Lymphadenopathy:      Cervical: No cervical adenopathy.   Skin:     Capillary Refill: Capillary refill takes less than 2 seconds.      Findings: Rash present.             Comments: Erythematous vesicular eruption to " right lower extremity in several vertical lines consistent with poison ivy dermatitis    Neurological:      General: No focal deficit present.      Mental Status: He is alert and oriented to person, place, and time. Mental status is at baseline.   Psychiatric:         Mood and Affect: Mood normal.         Behavior: Behavior normal.         Assessment/Plan   Problem List Items Addressed This Visit       Constipation     -Will continue miralax daily with addition of linzess   -Referral to GI given sudden change in bowel habits for consideration for colonoscopy          Relevant Medications    polyethylene glycol (Glycolax, Miralax) 17 gram packet    linaCLOtide (Linzess) 145 mcg capsule     Other Visit Diagnoses       Poison ivy dermatitis    -  Primary    Relevant Medications    clobetasol (Temovate) 0.05 % cream             Pt is aware of the importance of following up with GI due to sudden change in bowel habits. Discussed with attending physician Dr. Blackburn.      Gordy Moralez DO

## 2023-07-21 NOTE — PROGRESS NOTES
I reviewed with the resident the medical history and the resident’s findings on physical examination.  I discussed with the resident the patient’s diagnosis and concur with the treatment plan as documented in the resident note.     James Blackburn MD

## 2023-07-21 NOTE — ASSESSMENT & PLAN NOTE
-Constipation x 1 month still not resolved s/p miralax cleanout followed by daily miralax   -Will continue miralax daily with addition of linzess   -Referral to GI given sudden change in bowel habits for consideration for colonoscopy

## 2023-07-21 NOTE — PATIENT INSTRUCTIONS
Thad- sorry you're not feeling better.     Let's continue miralax and add linzess daily.     Follow up with GI for further evaluation.

## 2023-07-24 DIAGNOSIS — K59.00 CONSTIPATION, UNSPECIFIED CONSTIPATION TYPE: Primary | ICD-10-CM

## 2023-07-24 DIAGNOSIS — K59.00 CONSTIPATION, UNSPECIFIED CONSTIPATION TYPE: ICD-10-CM

## 2023-07-24 RX ORDER — POLYETHYLENE GLYCOL 3350 17 G/17G
17 POWDER, FOR SOLUTION ORAL DAILY
Qty: 3 PACKET | Refills: 0 | Status: SHIPPED | OUTPATIENT
Start: 2023-07-24 | End: 2023-07-27

## 2023-07-24 RX ORDER — FLUTICASONE PROPIONATE 50 MCG
SPRAY, SUSPENSION (ML) NASAL
Qty: 100 PACKET | Refills: 0 | Status: SHIPPED | OUTPATIENT
Start: 2023-07-24 | End: 2023-07-24

## 2023-07-24 RX ORDER — POLYETHYLENE GLYCOL 3350 17 G/17G
17 POWDER, FOR SOLUTION ORAL DAILY
Qty: 238 G | Refills: 0 | Status: SHIPPED | OUTPATIENT
Start: 2023-07-24 | End: 2024-02-23 | Stop reason: SINTOL

## 2023-10-31 ENCOUNTER — TELEPHONE (OUTPATIENT)
Dept: PRIMARY CARE | Facility: CLINIC | Age: 33
End: 2023-10-31
Payer: COMMERCIAL

## 2024-01-04 ENCOUNTER — TELEPHONE (OUTPATIENT)
Dept: PRIMARY CARE | Facility: CLINIC | Age: 34
End: 2024-01-04
Payer: COMMERCIAL

## 2024-01-04 DIAGNOSIS — F41.9 ANXIETY: ICD-10-CM

## 2024-01-04 RX ORDER — SERTRALINE HYDROCHLORIDE 25 MG/1
25 TABLET, FILM COATED ORAL DAILY
Qty: 90 TABLET | Refills: 0 | Status: SHIPPED | OUTPATIENT
Start: 2024-01-04 | End: 2024-04-05

## 2024-02-16 ENCOUNTER — APPOINTMENT (OUTPATIENT)
Dept: PRIMARY CARE | Facility: CLINIC | Age: 34
End: 2024-02-16
Payer: COMMERCIAL

## 2024-02-23 ENCOUNTER — OFFICE VISIT (OUTPATIENT)
Dept: PRIMARY CARE | Facility: CLINIC | Age: 34
End: 2024-02-23
Payer: COMMERCIAL

## 2024-02-23 ENCOUNTER — LAB (OUTPATIENT)
Dept: LAB | Facility: LAB | Age: 34
End: 2024-02-23
Payer: COMMERCIAL

## 2024-02-23 VITALS
WEIGHT: 181 LBS | OXYGEN SATURATION: 97 % | HEART RATE: 98 BPM | BODY MASS INDEX: 25.91 KG/M2 | DIASTOLIC BLOOD PRESSURE: 84 MMHG | SYSTOLIC BLOOD PRESSURE: 130 MMHG | RESPIRATION RATE: 18 BRPM | TEMPERATURE: 98.2 F | HEIGHT: 70 IN

## 2024-02-23 DIAGNOSIS — F41.9 ANXIETY: ICD-10-CM

## 2024-02-23 DIAGNOSIS — R21 RASH: ICD-10-CM

## 2024-02-23 DIAGNOSIS — Z00.00 ENCOUNTER FOR HEALTH MAINTENANCE EXAMINATION IN ADULT: Primary | ICD-10-CM

## 2024-02-23 DIAGNOSIS — Z00.00 ENCOUNTER FOR HEALTH MAINTENANCE EXAMINATION IN ADULT: ICD-10-CM

## 2024-02-23 DIAGNOSIS — Z11.3 SCREENING EXAMINATION FOR STD (SEXUALLY TRANSMITTED DISEASE): ICD-10-CM

## 2024-02-23 DIAGNOSIS — N48.5 PENILE ULCER: ICD-10-CM

## 2024-02-23 DIAGNOSIS — R19.4 BOWEL HABIT CHANGES: ICD-10-CM

## 2024-02-23 DIAGNOSIS — K59.00 CONSTIPATION, UNSPECIFIED CONSTIPATION TYPE: ICD-10-CM

## 2024-02-23 DIAGNOSIS — M94.0 COSTOCHONDRITIS: ICD-10-CM

## 2024-02-23 PROBLEM — F17.200 TOBACCO USE DISORDER: Status: RESOLVED | Noted: 2023-07-14 | Resolved: 2024-02-23

## 2024-02-23 LAB
25(OH)D3 SERPL-MCNC: 27 NG/ML (ref 30–100)
ALBUMIN SERPL BCP-MCNC: 5 G/DL (ref 3.4–5)
ALP SERPL-CCNC: 42 U/L (ref 33–120)
ALT SERPL W P-5'-P-CCNC: 16 U/L (ref 10–52)
ANION GAP SERPL CALC-SCNC: 11 MMOL/L (ref 10–20)
AST SERPL W P-5'-P-CCNC: 16 U/L (ref 9–39)
BILIRUB SERPL-MCNC: 0.4 MG/DL (ref 0–1.2)
BUN SERPL-MCNC: 18 MG/DL (ref 6–23)
CALCIUM SERPL-MCNC: 10.2 MG/DL (ref 8.6–10.3)
CHLORIDE SERPL-SCNC: 102 MMOL/L (ref 98–107)
CHOLEST SERPL-MCNC: 193 MG/DL (ref 0–199)
CHOLESTEROL/HDL RATIO: 5.7
CO2 SERPL-SCNC: 29 MMOL/L (ref 21–32)
CREAT SERPL-MCNC: 0.93 MG/DL (ref 0.5–1.3)
EGFRCR SERPLBLD CKD-EPI 2021: >90 ML/MIN/1.73M*2
ERYTHROCYTE [DISTWIDTH] IN BLOOD BY AUTOMATED COUNT: 12 % (ref 11.5–14.5)
GLUCOSE SERPL-MCNC: 81 MG/DL (ref 74–99)
HCT VFR BLD AUTO: 45.2 % (ref 41–52)
HCV AB SER QL: NONREACTIVE
HDLC SERPL-MCNC: 33.8 MG/DL
HGB BLD-MCNC: 15 G/DL (ref 13.5–17.5)
MCH RBC QN AUTO: 29.6 PG (ref 26–34)
MCHC RBC AUTO-ENTMCNC: 33.2 G/DL (ref 32–36)
MCV RBC AUTO: 89 FL (ref 80–100)
NON-HDL CHOLESTEROL: 159 MG/DL (ref 0–149)
NRBC BLD-RTO: 0 /100 WBCS (ref 0–0)
PLATELET # BLD AUTO: 230 X10*3/UL (ref 150–450)
POTASSIUM SERPL-SCNC: 4 MMOL/L (ref 3.5–5.3)
PROT SERPL-MCNC: 7.8 G/DL (ref 6.4–8.2)
RBC # BLD AUTO: 5.06 X10*6/UL (ref 4.5–5.9)
SODIUM SERPL-SCNC: 138 MMOL/L (ref 136–145)
TREPONEMA PALLIDUM IGG+IGM AB [PRESENCE] IN SERUM OR PLASMA BY IMMUNOASSAY: NONREACTIVE
WBC # BLD AUTO: 8.5 X10*3/UL (ref 4.4–11.3)

## 2024-02-23 PROCEDURE — 36415 COLL VENOUS BLD VENIPUNCTURE: CPT

## 2024-02-23 PROCEDURE — 1036F TOBACCO NON-USER: CPT

## 2024-02-23 PROCEDURE — 85027 COMPLETE CBC AUTOMATED: CPT

## 2024-02-23 PROCEDURE — 83718 ASSAY OF LIPOPROTEIN: CPT

## 2024-02-23 PROCEDURE — 80053 COMPREHEN METABOLIC PANEL: CPT

## 2024-02-23 PROCEDURE — 99395 PREV VISIT EST AGE 18-39: CPT

## 2024-02-23 PROCEDURE — 86803 HEPATITIS C AB TEST: CPT

## 2024-02-23 PROCEDURE — 82465 ASSAY BLD/SERUM CHOLESTEROL: CPT

## 2024-02-23 PROCEDURE — 86780 TREPONEMA PALLIDUM: CPT

## 2024-02-23 PROCEDURE — 82306 VITAMIN D 25 HYDROXY: CPT

## 2024-02-23 RX ORDER — TRIAMCINOLONE ACETONIDE 1 MG/G
CREAM TOPICAL 2 TIMES DAILY
Qty: 30 G | Refills: 0 | Status: SHIPPED | OUTPATIENT
Start: 2024-02-23 | End: 2024-03-08

## 2024-02-23 RX ORDER — MUPIROCIN 20 MG/G
OINTMENT TOPICAL 3 TIMES DAILY
Qty: 22 G | Refills: 0 | Status: SHIPPED | OUTPATIENT
Start: 2024-02-23 | End: 2024-03-04

## 2024-02-23 SDOH — ECONOMIC STABILITY: FOOD INSECURITY: WITHIN THE PAST 12 MONTHS, THE FOOD YOU BOUGHT JUST DIDN'T LAST AND YOU DIDN'T HAVE MONEY TO GET MORE.: NEVER TRUE

## 2024-02-23 SDOH — HEALTH STABILITY: PHYSICAL HEALTH: ON AVERAGE, HOW MANY DAYS PER WEEK DO YOU ENGAGE IN MODERATE TO STRENUOUS EXERCISE (LIKE A BRISK WALK)?: 7 DAYS

## 2024-02-23 SDOH — HEALTH STABILITY: PHYSICAL HEALTH: ON AVERAGE, HOW MANY MINUTES DO YOU ENGAGE IN EXERCISE AT THIS LEVEL?: 30 MIN

## 2024-02-23 SDOH — ECONOMIC STABILITY: GENERAL
WHICH OF THE FOLLOWING DO YOU KNOW HOW TO USE AND HAVE ACCESS TO EVERY DAY? (CHOOSE ALL THAT APPLY): DESKTOP COMPUTER, LAPTOP COMPUTER, OR TABLET WITH BROADBAND INTERNET CONNECTION;SMARTPHONE WITH CELLULAR DATA PLAN

## 2024-02-23 SDOH — ECONOMIC STABILITY: INCOME INSECURITY: IN THE LAST 12 MONTHS, WAS THERE A TIME WHEN YOU WERE NOT ABLE TO PAY THE MORTGAGE OR RENT ON TIME?: NO

## 2024-02-23 SDOH — ECONOMIC STABILITY: FOOD INSECURITY: WITHIN THE PAST 12 MONTHS, YOU WORRIED THAT YOUR FOOD WOULD RUN OUT BEFORE YOU GOT MONEY TO BUY MORE.: NEVER TRUE

## 2024-02-23 ASSESSMENT — ENCOUNTER SYMPTOMS
COUGH: 0
VOMITING: 0
WHEEZING: 0
DEPRESSION: 0
FEVER: 0
PALPITATIONS: 0
POLYPHAGIA: 0
SHORTNESS OF BREATH: 0
SINUS PRESSURE: 0
DIFFICULTY URINATING: 0
LIGHT-HEADEDNESS: 0
LOSS OF SENSATION IN FEET: 0
ABDOMINAL PAIN: 0
ABDOMINAL DISTENTION: 0
OCCASIONAL FEELINGS OF UNSTEADINESS: 0
SORE THROAT: 0
SLEEP DISTURBANCE: 0
DYSURIA: 0
AGITATION: 0
DIZZINESS: 0
EYE DISCHARGE: 0
MYALGIAS: 1
RHINORRHEA: 0
EYE REDNESS: 0
CHILLS: 0
ARTHRALGIAS: 0
WOUND: 0
DIARRHEA: 0
NAUSEA: 0
CHOKING: 0
SEIZURES: 0
BACK PAIN: 0
CONSTIPATION: 1
NUMBNESS: 0
ACTIVITY CHANGE: 0
FREQUENCY: 0
HEADACHES: 0
FATIGUE: 0
SINUS PAIN: 0
STRIDOR: 0
EYE PAIN: 0
EYE ITCHING: 0
APPETITE CHANGE: 0
CHEST TIGHTNESS: 0
POLYDIPSIA: 0
FACIAL ASYMMETRY: 0

## 2024-02-23 ASSESSMENT — SOCIAL DETERMINANTS OF HEALTH (SDOH)
HOW OFTEN DO YOU ATTENT MEETINGS OF THE CLUB OR ORGANIZATION YOU BELONG TO?: NEVER
IN THE PAST 12 MONTHS, HAS THE ELECTRIC, GAS, OIL, OR WATER COMPANY THREATENED TO SHUT OFF SERVICE IN YOUR HOME?: NO
WITHIN THE LAST YEAR, HAVE YOU BEEN AFRAID OF YOUR PARTNER OR EX-PARTNER?: NO
HOW OFTEN DO YOU ATTEND CHURCH OR RELIGIOUS SERVICES?: NEVER
WITHIN THE LAST YEAR, HAVE TO BEEN RAPED OR FORCED TO HAVE ANY KIND OF SEXUAL ACTIVITY BY YOUR PARTNER OR EX-PARTNER?: NO
ARE YOU MARRIED, WIDOWED, DIVORCED, SEPARATED, NEVER MARRIED, OR LIVING WITH A PARTNER?: NEVER MARRIED
HOW OFTEN DO YOU GET TOGETHER WITH FRIENDS OR RELATIVES?: ONCE A WEEK
WITHIN THE LAST YEAR, HAVE YOU BEEN HUMILIATED OR EMOTIONALLY ABUSED IN OTHER WAYS BY YOUR PARTNER OR EX-PARTNER?: NO
IN A TYPICAL WEEK, HOW MANY TIMES DO YOU TALK ON THE PHONE WITH FAMILY, FRIENDS, OR NEIGHBORS?: MORE THAN THREE TIMES A WEEK
HOW HARD IS IT FOR YOU TO PAY FOR THE VERY BASICS LIKE FOOD, HOUSING, MEDICAL CARE, AND HEATING?: NOT HARD AT ALL
DO YOU BELONG TO ANY CLUBS OR ORGANIZATIONS SUCH AS CHURCH GROUPS UNIONS, FRATERNAL OR ATHLETIC GROUPS, OR SCHOOL GROUPS?: NO
WITHIN THE LAST YEAR, HAVE YOU BEEN KICKED, HIT, SLAPPED, OR OTHERWISE PHYSICALLY HURT BY YOUR PARTNER OR EX-PARTNER?: NO

## 2024-02-23 ASSESSMENT — LIFESTYLE VARIABLES
HOW MANY STANDARD DRINKS CONTAINING ALCOHOL DO YOU HAVE ON A TYPICAL DAY: PATIENT DOES NOT DRINK
AUDIT-C TOTAL SCORE: 0
HOW OFTEN DO YOU HAVE SIX OR MORE DRINKS ON ONE OCCASION: NEVER
SKIP TO QUESTIONS 9-10: 1
HOW OFTEN DO YOU HAVE A DRINK CONTAINING ALCOHOL: NEVER

## 2024-02-23 ASSESSMENT — PATIENT HEALTH QUESTIONNAIRE - PHQ9
SUM OF ALL RESPONSES TO PHQ9 QUESTIONS 1 & 2: 0
2. FEELING DOWN, DEPRESSED OR HOPELESS: NOT AT ALL
1. LITTLE INTEREST OR PLEASURE IN DOING THINGS: NOT AT ALL

## 2024-02-23 NOTE — PROGRESS NOTES
Subjective   Patient ID: [id number] 1990   Thad Pardo is a 33 y.o. male who presents for Annual Exam (Annual physical exam).  Patient is here for annual physical and to discuss multiple medical issues.  Patient states that he has a chronic nonhealing painful ulceration at the base of his penis which he believes was caused by a cut during shaving about one year ago.  He is currently sexually active with 1 female partner who is asymptomatic.  He denies dysuria, discharge, abdominal pain, or fever/chills.  Patient states that he was seen at [facility] yesterday for STI testing and was empirically started on doxycycline.  HIV was negative, chlamydia/gonorrhea are pending.  He has no previous history of STI. He was started on oral doxycyline for presumed UTI.   Patient also reports pruritic painful rash to the left buttock that is new over the last 2 to 3 days.  He has never had a similar rash before and denies any other areas of similar changes.   Patient has chronic costochondritis, was previously treated with NSAIDs and muscle relaxers.  Rheumatologic evaluation by Dr. Tobias last year was entirely negative.  He was referred to physical therapy, but never went.  He is no longer taking these medications, but states he continues to have cracking and popping of the sternum and frequent pain.  He is interested in referral to physical therapy.  Patient also continues to have constipation the past year.  He has used MiraLAX with some benefit.  He reports pencil thin stools.  Patient was referred to GI for possible colonoscopy last year when his stool suddenly changed caliber, but he never followed through with this.  Patient also history of anxiety, states this is going well.  For the past several months he has been cutting his Zoloft 25 mg half with good effect.  He would like to get off this medication.    PMH: anxiety, constipation, costochondritis   PSH: denies   FMH: Dad - bipolar, anxiety, DM; Mom -  "anxiety, prediabetic    Social: lives at home with son, works at [employer]   Diet: \"not great,\" does eat some fruit small amount of veggies; eats a lot of junk food, recently started drinking soda   Exercise: weightlifting several times per week   Alcohol: occasional social alcohol use   Tobacco: denies smoking   Illicit drugs: occasional marijuana smoker   Tdap: up to date, 2019           Review of Systems   Constitutional:  Negative for activity change, appetite change, chills, fatigue and fever.   HENT:  Negative for congestion, dental problem, ear pain, mouth sores, postnasal drip, rhinorrhea, sinus pressure, sinus pain, sneezing and sore throat.    Eyes:  Negative for pain, discharge, redness and itching.   Respiratory:  Negative for cough, choking, chest tightness, shortness of breath, wheezing and stridor.    Cardiovascular:  Negative for chest pain, palpitations and leg swelling.   Gastrointestinal:  Positive for constipation. Negative for abdominal distention, abdominal pain, diarrhea, nausea and vomiting.   Endocrine: Negative for polydipsia, polyphagia and polyuria.   Genitourinary:  Negative for decreased urine volume, difficulty urinating, dysuria, enuresis, frequency and urgency.   Musculoskeletal:  Positive for myalgias. Negative for arthralgias and back pain.   Skin:  Positive for rash. Negative for pallor and wound.   Neurological:  Negative for dizziness, seizures, syncope, facial asymmetry, light-headedness, numbness and headaches.   Psychiatric/Behavioral:  Negative for agitation, behavioral problems, sleep disturbance and suicidal ideas.        Objective   /84 (BP Location: Left arm)   Pulse 98   Temp 36.8 °C (98.2 °F)   Resp 18   Ht 1.778 m (5' 10\")   Wt 82.1 kg (181 lb)   SpO2 97%   BMI 25.97 kg/m²    Physical Exam  Vitals and nursing note reviewed.   Constitutional:       General: He is not in acute distress.     Appearance: Normal appearance. He is not toxic-appearing. "   HENT:      Head: Normocephalic and atraumatic.      Right Ear: Tympanic membrane, ear canal and external ear normal.      Left Ear: Tympanic membrane, ear canal and external ear normal.      Nose: Nose normal.      Mouth/Throat:      Mouth: Mucous membranes are moist.      Pharynx: No oropharyngeal exudate or posterior oropharyngeal erythema.   Eyes:      General: No scleral icterus.     Extraocular Movements: Extraocular movements intact.      Pupils: Pupils are equal, round, and reactive to light.   Cardiovascular:      Rate and Rhythm: Normal rate and regular rhythm.      Pulses: Normal pulses.      Heart sounds: Normal heart sounds. No murmur heard.     No friction rub. No gallop.   Pulmonary:      Effort: Pulmonary effort is normal. No respiratory distress.      Breath sounds: Normal breath sounds. No stridor. No wheezing, rhonchi or rales.   Abdominal:      General: Abdomen is flat. Bowel sounds are normal. There is no distension.      Palpations: Abdomen is soft. There is no mass.      Tenderness: There is no abdominal tenderness. There is no right CVA tenderness, left CVA tenderness, guarding or rebound.      Hernia: No hernia is present.   Musculoskeletal:         General: No swelling, deformity or signs of injury. Normal range of motion.      Cervical back: Normal range of motion and neck supple. No rigidity.      Right lower leg: No edema.      Left lower leg: No edema.   Lymphadenopathy:      Cervical: No cervical adenopathy.   Skin:     General: Skin is warm and dry.      Capillary Refill: Capillary refill takes less than 2 seconds.      Findings: No rash.             Comments: 2 cm circular well circumscribed purple plaque with overlying scale to left buttock   1 cm irregular ulceration with surrounding erythema and overlying scab to base of penis    Neurological:      General: No focal deficit present.      Mental Status: He is alert and oriented to person, place, and time. Mental status is at  baseline.      Cranial Nerves: No cranial nerve deficit.      Sensory: No sensory deficit.      Motor: No weakness.      Coordination: Coordination normal.   Psychiatric:         Mood and Affect: Mood normal.         Behavior: Behavior normal.         Assessment/Plan   Problem List Items Addressed This Visit       Anxiety     - TEO-7 (General Anxiety Disorder-7) RESULT SUMMARY: 1 points, No anxiety disorder  - No SI/HI  - Will plan to taper off Zoloft          Bowel habit changes    Relevant Orders    Referral to Gastroenterology    Constipation    Relevant Orders    Referral to Gastroenterology    Costochondritis    Relevant Orders    Referral to Physical Therapy     Other Visit Diagnoses       Encounter for health maintenance examination in adult    -  Primary    Relevant Orders    Comprehensive metabolic panel    CBC    Vitamin D 25-Hydroxy,Total (for eval of Vitamin D levels)    Lipid Panel Non-Fasting    Rash        DDx: psoriasis vs eczema vs tinea   - Will treat with topical steroid and monitor    Relevant Medications    triamcinolone (Kenalog) 0.1 % cream    Screening examination for STD (sexually transmitted disease)        Relevant Orders    Hepatitis C Antibody    Syphilis Screen with Reflex    Penile ulcer        Folliculitis vs STI    Relevant Medications    mupirocin (Bactroban) 2 % ointment    Other Relevant Orders    Follow Up In [facility] - PCP - Established          Plan as above. Follow up in 2 weeks. Pt will continue doxycycline.     Discussed with attending physician Dr. Mace.       Gordy Moralez DO

## 2024-02-23 NOTE — PATIENT INSTRUCTIONS
Thad,     Good to see you!     Taper yourself off the Zoloft - take half every other day for 2 weeks.     Apply steroid (triamcinolone) to rash on buttocks twice daily for 2 weeks.     Continue oral antibiotic. You can apply topical antibiotic (mupirocin) twice daily to the area of the penis.     I also referred you back to gastroenterology and physical therapy.     We will call you with results of labs.     Follow up in 2 weeks.

## 2024-02-23 NOTE — ASSESSMENT & PLAN NOTE
- TEO-7 (General Anxiety Disorder-7) RESULT SUMMARY: 1 points, No anxiety disorder  - No SI/HI  - Will plan to taper off Zoloft

## 2024-03-08 ENCOUNTER — APPOINTMENT (OUTPATIENT)
Dept: PRIMARY CARE | Facility: CLINIC | Age: 34
End: 2024-03-08
Payer: COMMERCIAL

## 2024-03-11 PROBLEM — R07.89 STERNAL PAIN: Status: ACTIVE | Noted: 2024-03-11

## 2024-03-20 ENCOUNTER — OFFICE VISIT (OUTPATIENT)
Dept: GASTROENTEROLOGY | Facility: CLINIC | Age: 34
End: 2024-03-20
Payer: COMMERCIAL

## 2024-03-20 VITALS
SYSTOLIC BLOOD PRESSURE: 135 MMHG | HEART RATE: 73 BPM | DIASTOLIC BLOOD PRESSURE: 84 MMHG | WEIGHT: 177 LBS | BODY MASS INDEX: 25.4 KG/M2 | TEMPERATURE: 97.9 F

## 2024-03-20 DIAGNOSIS — K59.00 CONSTIPATION, UNSPECIFIED CONSTIPATION TYPE: ICD-10-CM

## 2024-03-20 DIAGNOSIS — R19.4 BOWEL HABIT CHANGES: ICD-10-CM

## 2024-03-20 PROCEDURE — 99203 OFFICE O/P NEW LOW 30 MIN: CPT | Performed by: NURSE PRACTITIONER

## 2024-03-20 PROCEDURE — 99213 OFFICE O/P EST LOW 20 MIN: CPT | Performed by: NURSE PRACTITIONER

## 2024-03-20 PROCEDURE — 1036F TOBACCO NON-USER: CPT | Performed by: NURSE PRACTITIONER

## 2024-03-20 ASSESSMENT — ENCOUNTER SYMPTOMS
APNEA: 0
MUSCULOSKELETAL NEGATIVE: 1
FEVER: 0
ROS GI COMMENTS: SEE HPI
EYES NEGATIVE: 1
NEUROLOGICAL NEGATIVE: 1
FATIGUE: 0
RESPIRATORY NEGATIVE: 1
SHORTNESS OF BREATH: 0
HEMATOLOGIC/LYMPHATIC NEGATIVE: 1
DIFFICULTY URINATING: 0
DIAPHORESIS: 0
ALLERGIC/IMMUNOLOGIC NEGATIVE: 1
COUGH: 0
WHEEZING: 0
STRIDOR: 0
CHILLS: 0
PSYCHIATRIC NEGATIVE: 1
CHEST TIGHTNESS: 0
ENDOCRINE NEGATIVE: 1
CARDIOVASCULAR NEGATIVE: 1

## 2024-03-20 ASSESSMENT — PAIN SCALES - GENERAL: PAINLEVEL: 0-NO PAIN

## 2024-03-20 NOTE — PATIENT INSTRUCTIONS
Start benefiber (one teaspoon daily)  Start Miralax 1-2 times/day    Check xray    See constipation education    Check labs     Follow up in 1 month and if continues we will discuss a colonoscopy              Patient Education:    Constipation refers to a change in bowel habits, but it has varied meanings. Stools may be too hard or too small, difficult to pass, or infrequent (less than three times per week). People with constipation may also notice a frequent need to strain and a sense that the bowels are not empty.    Your bowels like consistency and routine.     Behavior changes -- The bowels are most active following meals, and this is often the time when stools will pass most readily. If you ignore your body's signals to have a bowel movement, the signals become weaker and weaker over time.    By paying close attention to these signals, you may have an easier time moving your bowels. Drinking a caffeine-containing beverage in the morning may also be helpful.    Increase fiber -- Increasing fiber in your diet may reduce or eliminate constipation. The recommended amount of dietary fiber is 20 to 35 grams of fiber per day. Examples of high fiber foods include pears, spinach, apples, raspberries.     Fiber side effects -- Consuming large amounts of fiber can cause abdominal bloating or gas; this can be minimized by starting with a small amount and slowly increasing until stools become softer and more frequent.    More recently, kiwi fruit has been identified as helping with constipation. It is recommended to eat two daily.    Whatever you decide to use, please try daily for 2 weeks to notice improvement.     If you continue to have constipation despite trying these methods, please schedule a follow-up appointment.

## 2024-03-20 NOTE — PROGRESS NOTES
"Subjective   Patient ID: Thad Pardo is a 33 y.o. male who presents for Constipation and Abdominal Pain.  Presents today for constipation and abdominal pain  Was given Miralax and fiber     Started Zoloft and constipation started    Has intermittent constipation in life and now more frequent  He also has pain, lower abdomen and this is sometimes alleviated with a BM  Described as bloating    Has penciling of stool-not always, some are thin but not pencil   Has normal log like bowel movements     Has switched diet--improvement with fiber  He is not using Miralax    He is having a BM daily, has pain \"randomly a few times/week\"    Would like a colonoscopy but will follow up in 1 month to discuss, would like to try other constipation treatments     He denies rectal bleeding, vomiting, +flatus    Family Hx: He denies a family hx of CRC, GI cancers        Review of Systems   Constitutional:  Negative for chills, diaphoresis, fatigue and fever.   HENT: Negative.     Eyes: Negative.    Respiratory: Negative.  Negative for apnea, cough, chest tightness, shortness of breath, wheezing and stridor.    Cardiovascular: Negative.    Gastrointestinal:         See HPI    Endocrine: Negative.    Genitourinary: Negative.  Negative for difficulty urinating.   Musculoskeletal: Negative.    Skin: Negative.    Allergic/Immunologic: Negative.    Neurological: Negative.    Hematological: Negative.    Psychiatric/Behavioral: Negative.         Objective   Physical Exam  Constitutional:       Appearance: Normal appearance. He is normal weight.   HENT:      Nose: Nose normal.   Eyes:      General: Lids are normal.   Cardiovascular:      Rate and Rhythm: Normal rate and regular rhythm.      Heart sounds: Normal heart sounds.   Pulmonary:      Effort: Pulmonary effort is normal.      Breath sounds: Normal breath sounds.   Abdominal:      General: Bowel sounds are normal.      Comments: Diminished bowel sounds    Musculoskeletal:         " General: Normal range of motion.   Skin:     General: Skin is warm and dry.   Neurological:      Mental Status: He is alert and oriented to person, place, and time.   Psychiatric:         Mood and Affect: Mood normal.         Assessment/Plan   Diagnoses and all orders for this visit:  Constipation, unspecified constipation type  -     TSH with reflex to Free T4 if abnormal; Future  -     XR abdomen 2 views supine and erect or decub  Bowel habit changes  -     TSH with reflex to Free T4 if abnormal; Future  -     XR abdomen 2 views supine and erect or decub     33 year old male with chronic intermittent constipation presents for worsening constipation, bloating. Seems to have started when he started using Zoloft. He was given miralax but not using this however did notice improvement with fiber. Will check AXR for stool burden and TSH. He will start fiber and Miralax and will discuss colonoscopy at follow-up in 1 month.   We discussed risks, benefits of a colonoscopy.         JESSICA Rooney-CNP 03/20/24 10:15 AM

## 2024-04-03 ENCOUNTER — LAB (OUTPATIENT)
Dept: LAB | Facility: LAB | Age: 34
End: 2024-04-03
Payer: COMMERCIAL

## 2024-04-03 DIAGNOSIS — K59.00 CONSTIPATION, UNSPECIFIED CONSTIPATION TYPE: ICD-10-CM

## 2024-04-03 DIAGNOSIS — R19.4 BOWEL HABIT CHANGES: ICD-10-CM

## 2024-04-03 LAB — TSH SERPL-ACNC: 1.21 MIU/L (ref 0.44–3.98)

## 2024-04-03 PROCEDURE — 36415 COLL VENOUS BLD VENIPUNCTURE: CPT

## 2024-04-03 PROCEDURE — 84443 ASSAY THYROID STIM HORMONE: CPT

## 2024-04-05 DIAGNOSIS — F41.9 ANXIETY: ICD-10-CM

## 2024-04-05 RX ORDER — SERTRALINE HYDROCHLORIDE 25 MG/1
25 TABLET, FILM COATED ORAL DAILY
Qty: 90 TABLET | Refills: 1 | Status: SHIPPED | OUTPATIENT
Start: 2024-04-05

## 2024-04-16 DIAGNOSIS — Z12.11 COLON CANCER SCREENING: ICD-10-CM

## 2024-04-16 DIAGNOSIS — R19.4 BOWEL HABIT CHANGES: Primary | ICD-10-CM

## 2024-04-16 DIAGNOSIS — K59.09 OTHER CONSTIPATION: ICD-10-CM

## 2024-04-17 RX ORDER — POLYETHYLENE GLYCOL 3350, SODIUM CHLORIDE, SODIUM BICARBONATE, POTASSIUM CHLORIDE 420; 11.2; 5.72; 1.48 G/4L; G/4L; G/4L; G/4L
4000 POWDER, FOR SOLUTION ORAL ONCE
Qty: 4000 ML | Refills: 0 | Status: SHIPPED | OUTPATIENT
Start: 2024-04-17 | End: 2024-04-17

## 2024-04-18 DIAGNOSIS — Z12.11 COLON CANCER SCREENING: Primary | ICD-10-CM

## 2024-04-19 RX ORDER — POLYETHYLENE GLYCOL 3350, SODIUM SULFATE ANHYDROUS, SODIUM BICARBONATE, SODIUM CHLORIDE, POTASSIUM CHLORIDE 236; 22.74; 6.74; 5.86; 2.97 G/4L; G/4L; G/4L; G/4L; G/4L
4000 POWDER, FOR SOLUTION ORAL ONCE
Qty: 4000 ML | Refills: 0 | Status: SHIPPED | OUTPATIENT
Start: 2024-04-19 | End: 2024-04-19

## 2024-04-25 ENCOUNTER — ANESTHESIA EVENT (OUTPATIENT)
Dept: GASTROENTEROLOGY | Facility: EXTERNAL LOCATION | Age: 34
End: 2024-04-25

## 2024-05-03 ENCOUNTER — ANESTHESIA (OUTPATIENT)
Dept: GASTROENTEROLOGY | Facility: EXTERNAL LOCATION | Age: 34
End: 2024-05-03

## 2024-05-03 ENCOUNTER — APPOINTMENT (OUTPATIENT)
Dept: GASTROENTEROLOGY | Facility: EXTERNAL LOCATION | Age: 34
End: 2024-05-03
Payer: COMMERCIAL

## 2024-06-18 DIAGNOSIS — R19.4 BOWEL HABIT CHANGES: Primary | ICD-10-CM

## 2024-06-18 DIAGNOSIS — R19.7 DIARRHEA, UNSPECIFIED TYPE: ICD-10-CM

## 2024-06-20 ENCOUNTER — OFFICE VISIT (OUTPATIENT)
Dept: SURGERY | Facility: CLINIC | Age: 34
End: 2024-06-20
Payer: COMMERCIAL

## 2024-06-20 VITALS
WEIGHT: 166 LBS | HEART RATE: 87 BPM | DIASTOLIC BLOOD PRESSURE: 89 MMHG | BODY MASS INDEX: 23.77 KG/M2 | SYSTOLIC BLOOD PRESSURE: 132 MMHG | TEMPERATURE: 97.7 F | HEIGHT: 70 IN

## 2024-06-20 DIAGNOSIS — K64.8 INFLAMED INTERNAL HEMORRHOID: Primary | ICD-10-CM

## 2024-06-20 PROCEDURE — 46600 DIAGNOSTIC ANOSCOPY SPX: CPT | Performed by: NURSE PRACTITIONER

## 2024-06-20 PROCEDURE — 99203 OFFICE O/P NEW LOW 30 MIN: CPT | Performed by: NURSE PRACTITIONER

## 2024-06-20 PROCEDURE — 99213 OFFICE O/P EST LOW 20 MIN: CPT | Performed by: NURSE PRACTITIONER

## 2024-06-20 NOTE — PROGRESS NOTES
History Of Present Illness  Thad Pardo is a 33 y.o. male presenting with anal pain and constipation.     He has been having issues with his BM's for years.  He will have a pinching pain with his BM's that can linger for a little bit afterwards.  He will have 2 soft-hard BM's every day to every few days.  He will strain with his Bms and can sit long on the toilet to have a BM.  He was taking Miralax daily for a while but would have loose stools.  No c/o any accidents or leakage of stool.  NO c/o any rectal bleeding.  He will have thinner stools and then more bulked stools.      NO colonoscopy or any perianal surgeries.      Past Medical History  He has no past medical history on file.    Surgical History  He has no past surgical history on file.     Social History  He reports that he has never smoked. He has never used smokeless tobacco. He reports current alcohol use. He reports current drug use. Drug: Marijuana.    Family History  No family history on file.     Allergies  Patient has no known allergies.    Review of Systems   All other systems reviewed and are negative.       Physical Exam  Constitutional:       Appearance: Normal appearance.   HENT:      Head: Normocephalic and atraumatic.   Pulmonary:      Effort: Pulmonary effort is normal.   Musculoskeletal:         General: Normal range of motion.   Skin:     General: Skin is warm and dry.   Neurological:      General: No focal deficit present.      Mental Status: He is alert and oriented to person, place, and time.   Psychiatric:         Mood and Affect: Mood normal.         Behavior: Behavior normal.         Anoscopy    Date/Time: 6/24/2024 1:01 PM    Performed by: RICHARD Christian  Authorized by: RICHARD Christian    Consent:     Consent obtained:  Verbal    Consent given by:  Patient    Risks, benefits, and alternatives were discussed: yes    Universal protocol:     Procedure explained and questions answered to patient or proxy's  satisfaction: yes      Patient identity confirmed:  Verbally with patient  Post-procedure details:     Procedure completion:  Tolerated  Comments:      No external hemorrhoids.  Good tone on HUMBLE.  ON anoscopy, looking in 360 degrees, he has mild irritation of the internal hemorrhoids.  No pain or active bleeding    Last Recorded Vitals  /89   Pulse 87   Temp 36.5 °C (97.7 °F)   Wt 75.3 kg (166 lb)        Assessment/Plan   Thad has mild irritation of the internal hemorrhoids and constipation.  He will start taking 1/2 dose of Miralax daily along with a fiber supplement to keep his stools soft.  He will schedule to have a colonoscopy in the near future for further evaluation of the constipation and bleeding.  He will call with any issues and will follow up after the colonoscopy if needed.         Lindsey Faulkner, JESSICA-CNP

## 2024-06-21 ENCOUNTER — LAB (OUTPATIENT)
Dept: LAB | Facility: LAB | Age: 34
End: 2024-06-21
Payer: COMMERCIAL

## 2024-06-21 DIAGNOSIS — R19.7 DIARRHEA, UNSPECIFIED TYPE: ICD-10-CM

## 2024-06-21 DIAGNOSIS — R19.4 BOWEL HABIT CHANGES: ICD-10-CM

## 2024-06-21 PROCEDURE — 82653 EL-1 FECAL QUANTITATIVE: CPT

## 2024-06-21 PROCEDURE — 83993 ASSAY FOR CALPROTECTIN FECAL: CPT

## 2024-06-21 PROCEDURE — 87329 GIARDIA AG IA: CPT

## 2024-06-21 PROCEDURE — 87328 CRYPTOSPORIDIUM AG IA: CPT

## 2024-06-24 DIAGNOSIS — R19.8 ALTERED BOWEL FUNCTION: ICD-10-CM

## 2024-06-24 DIAGNOSIS — K59.00 CONSTIPATION, UNSPECIFIED CONSTIPATION TYPE: ICD-10-CM

## 2024-06-24 DIAGNOSIS — R19.4 BOWEL HABIT CHANGES: ICD-10-CM

## 2024-06-24 DIAGNOSIS — K59.09 OTHER CONSTIPATION: ICD-10-CM

## 2024-06-24 PROBLEM — M54.50 CHRONIC LOW BACK PAIN: Status: ACTIVE | Noted: 2023-06-20

## 2024-06-24 PROBLEM — R53.83 FATIGUE: Status: ACTIVE | Noted: 2023-06-20

## 2024-06-24 PROBLEM — M25.50 ARTHRALGIA OF MULTIPLE JOINTS: Status: ACTIVE | Noted: 2023-06-20

## 2024-06-24 PROBLEM — N48.5 ULCER OF PENIS: Status: ACTIVE | Noted: 2024-06-24

## 2024-06-24 PROBLEM — R14.1 ABDOMINAL GAS PAIN: Status: ACTIVE | Noted: 2024-06-24

## 2024-06-24 PROBLEM — R21 RASH: Status: ACTIVE | Noted: 2024-06-24

## 2024-06-24 PROBLEM — K64.8 INFLAMED INTERNAL HEMORRHOID: Status: ACTIVE | Noted: 2024-06-24

## 2024-06-24 PROBLEM — G89.29 CHRONIC LOW BACK PAIN: Status: ACTIVE | Noted: 2023-06-20

## 2024-06-24 PROCEDURE — 46600 DIAGNOSTIC ANOSCOPY SPX: CPT | Performed by: NURSE PRACTITIONER

## 2024-06-25 LAB — CALPROTECTIN STL-MCNT: <5 UG/G

## 2024-06-26 LAB
CRYPTOSP AG STL QL IA: NEGATIVE
ELASTASE PANC STL-MCNT: >800 UG/G
G LAMBLIA AG STL QL IA: NEGATIVE
O+P STL MICRO: NEGATIVE

## 2024-06-27 RX ORDER — SODIUM, POTASSIUM,MAG SULFATES 17.5-3.13G
SOLUTION, RECONSTITUTED, ORAL ORAL
Qty: 1 EACH | Refills: 0 | Status: SHIPPED | OUTPATIENT
Start: 2024-06-27

## 2024-07-01 ENCOUNTER — TELEPHONE (OUTPATIENT)
Dept: GASTROENTEROLOGY | Facility: CLINIC | Age: 34
End: 2024-07-01
Payer: COMMERCIAL

## 2024-07-01 NOTE — TELEPHONE ENCOUNTER
"Called patient and discuss colonoscopy vs cologuard   We reviewed \"colon cancer screening\" guidelines and recommendations vs. Colonoscopy for a problem based examination  We also discussed cologuard may miss small polyps and also does not detect things such as inflammation in the colon   We reviewed stool test results  We also discussed risks of colonoscopy such as perforation, respiratory issues and bleeding  He verbalized understanding to all information above           ----- Message from Henna Valerio MA sent at 6/27/2024 10:59 AM EDT -----  Regarding: FW: Concerned still.  Contact: [phone]    ----- Message -----  From: Thad Pardo  Sent: 6/27/2024   9:29 AM EDT  To: [id number] G[facility] Clinical Support Staff  Subject: Concerned still.                                 yeah that’s fine anytime is good for me     "

## 2024-07-11 ENCOUNTER — ANESTHESIA EVENT (OUTPATIENT)
Dept: GASTROENTEROLOGY | Facility: EXTERNAL LOCATION | Age: 34
End: 2024-07-11

## 2024-07-11 ENCOUNTER — TELEPHONE (OUTPATIENT)
Dept: PRIMARY CARE | Facility: CLINIC | Age: 34
End: 2024-07-11
Payer: COMMERCIAL

## 2024-07-22 ENCOUNTER — ANESTHESIA (OUTPATIENT)
Dept: GASTROENTEROLOGY | Facility: EXTERNAL LOCATION | Age: 34
End: 2024-07-22

## 2024-07-22 ENCOUNTER — APPOINTMENT (OUTPATIENT)
Dept: GASTROENTEROLOGY | Facility: EXTERNAL LOCATION | Age: 34
End: 2024-07-22
Payer: COMMERCIAL

## 2024-10-09 ENCOUNTER — APPOINTMENT (OUTPATIENT)
Dept: UROLOGY | Facility: CLINIC | Age: 34
End: 2024-10-09
Payer: COMMERCIAL

## 2024-10-09 VITALS
SYSTOLIC BLOOD PRESSURE: 137 MMHG | DIASTOLIC BLOOD PRESSURE: 82 MMHG | HEART RATE: 97 BPM | TEMPERATURE: 97.9 F | BODY MASS INDEX: 24.25 KG/M2 | WEIGHT: 169 LBS

## 2024-10-09 DIAGNOSIS — N52.1 ERECTILE DYSFUNCTION DUE TO DISEASES CLASSIFIED ELSEWHERE: Primary | ICD-10-CM

## 2024-10-09 PROCEDURE — 99204 OFFICE O/P NEW MOD 45 MIN: CPT | Performed by: STUDENT IN AN ORGANIZED HEALTH CARE EDUCATION/TRAINING PROGRAM

## 2024-10-09 RX ORDER — TADALAFIL 5 MG/1
5 TABLET ORAL DAILY
Qty: 30 TABLET | Refills: 11 | Status: SHIPPED | OUTPATIENT
Start: 2024-10-09 | End: 2025-10-04

## 2024-10-09 NOTE — PROGRESS NOTES
Scribed for Dr. Ramiro Sylvester by Aleks Moreno. I, Dr. Ramiro Sylvester have personally reviewed and agreed with the information entered by the Virtual Scribe. 10/09/24.    ASSESSMENT:  Problem List Items Addressed This Visit    None  Visit Diagnoses       Erectile dysfunction due to diseases classified elsewhere    -  Primary    Relevant Medications    tadalafil (Cialis) 5 mg tablet    Other Relevant Orders    Testosterone           PLAN:  #Erectile dysfunction  Elects to trial course of daily Cialis 5 mg.   Risks, benefits, and alternatives discussed.   Opts to perform serum testosterone.   RTC in 6-8 weeks for med check and results.     Patient was seen today with the complaint of erectile dysfunction (ED). I went over the definition of ED, which is the inability to obtain or maintain an erection sufficient for satisfactory sexual activity. I outlined that erectile function is a complex interplay of neural vascular, hormonal and psychological factors. Disruption in any of these pathways may lead to ED. In terms of treatment options; PDE5i (Viagra, Cialis, etc.), intracavernosal injections (ICI), vacuum erection devices (LEONIDAS), and penile implants (IPP) were discussed in detail.     All questions were answered to the patient’s satisfaction.  Patient agrees with the plan and wishes to proceed.  Continue follow-up for ongoing care of his chronic medical conditions.       History of Present Illness (HPI):  Thad presents as a new patient for an evaluation.  The patient’s EMR has been reviewed.  Lives in [place], [place].  Occupation: .     TODAY: (10/09/24)  Reports hx of erectile dysfunction.   Able to achieve, but difficulty with maintaining.  Still able to achieve nighttime erections.   Denies penile curvature or pain during an erection.   He is sexually active, in a long-term relationship.   Has not tried any medications in the past for ED.  Denies any urinary issues.   ADRIANA - 11.    PMH: Denies  PSH:  Denies  FH: Denies FH of  malignancy.   SH: Non-smoker.     History reviewed. No pertinent past medical history.  History reviewed. No pertinent surgical history.  No family history on file.  Social History     Tobacco Use   Smoking Status Never   Smokeless Tobacco Never     Current Outpatient Medications   Medication Sig Dispense Refill    sertraline (Zoloft) 25 mg tablet TAKE 1 TABLET BY MOUTH EVERY DAY (Patient not taking: Reported on 6/20/2024) 90 tablet 1    sodium,potassium,mag sulfates (Suprep) 17.5-3.13-1.6 gram recon soln solution Take one bottle twice as directed by the prep instructions 1 each 0     No current facility-administered medications for this visit.     No Known Allergies  Past medical, surgical, family and social history in the chart was reviewed and is accurate including any additions to what is in this HPI.    REVIEW OF SYSTEMS (ROS):   Constitutional: denies any unintentional weight loss or change in strength.  Integumentary: denies any rashes or pruritus.  Eyes: denies any double vision or eye pain.  Ear/Nose/Mouth/Throat: denies any nosebleeds or gum bleeds.  Cardiovascular: denies any chest pain or syncope.  Respiratory: denies hemoptysis.  Gastrointestinal: denies nausea or vomiting.  Musculoskeletal: denies muscle cramping or weakness.  Neurologic: denies convulsions or seizures.  Hematologic/Lymphatic: denies bleeding tendencies.  Endocrine: denies heat/cold intolerance.  All other systems have been reviewed and are negative unless otherwise noted in the HPI.     OBJECTIVE:  Visit Vitals  /82   Pulse 97   Temp 36.6 °C (97.9 °F) (Temporal)     PHYSICAL EXAM:  Constitutional: No obvious distress.  Eyes: Non-injected conjunctiva, sclera clear, EOMI.  Ears/Nose/Mouth/Throat: No obvious drainage per ears or nose.  Cardiovascular: Extremities are warm and well perfused. No edema, cyanosis or pallor.  Respiratory: No audible wheezing/stridor; respirations do not appear  labored.  Gastrointestinal: Abdomen soft, not distended.  Musculoskeletal: Normal ROM of extremities.  Skin: No obvious rashes or open sores.  Neurologic: Alert and oriented, CN 2-12 grossly intact.  Psychiatric: Answers questions appropriately with normal affect.  Hematologic/Lymphatic/Immunologic: No obvious bruises or sites of spontaneous bleeding.  Genitourinary: No CVA tenderness, bladder not palpable.     LABS & IMAGING:  Lab Results   Component Value Date    WBC 8.5 02/23/2024    HGB 15.0 02/23/2024    HCT 45.2 02/23/2024     02/23/2024    CHOL 193 02/23/2024    HDL 33.8 02/23/2024    ALT 16 02/23/2024    AST 16 02/23/2024     02/23/2024    K 4.0 02/23/2024     02/23/2024    CREATININE 0.93 02/23/2024    BUN 18 02/23/2024    CO2 29 02/23/2024    TSH 1.21 04/03/2024     Scribed for Dr. Ramiro Sylvester by Aleks Moreno.  I, Dr. Ramiro Sylvester have personally reviewed and agreed with the information entered by the Virtual Scribe. 10/09/24.

## 2024-10-10 ENCOUNTER — LAB (OUTPATIENT)
Dept: LAB | Facility: LAB | Age: 34
End: 2024-10-10
Payer: COMMERCIAL

## 2024-10-10 DIAGNOSIS — N52.1 ERECTILE DYSFUNCTION DUE TO DISEASES CLASSIFIED ELSEWHERE: ICD-10-CM

## 2024-10-10 LAB — TESTOST SERPL-MCNC: 518 NG/DL (ref 240–1000)

## 2024-10-10 PROCEDURE — 36415 COLL VENOUS BLD VENIPUNCTURE: CPT

## 2024-10-10 PROCEDURE — 84403 ASSAY OF TOTAL TESTOSTERONE: CPT

## 2024-12-13 ENCOUNTER — APPOINTMENT (OUTPATIENT)
Dept: UROLOGY | Facility: CLINIC | Age: 34
End: 2024-12-13
Payer: COMMERCIAL

## 2025-03-20 ENCOUNTER — APPOINTMENT (OUTPATIENT)
Dept: PRIMARY CARE | Facility: CLINIC | Age: 35
End: 2025-03-20
Payer: COMMERCIAL

## 2025-03-24 ENCOUNTER — APPOINTMENT (OUTPATIENT)
Dept: PRIMARY CARE | Facility: CLINIC | Age: 35
End: 2025-03-24
Payer: COMMERCIAL

## 2025-03-31 ENCOUNTER — APPOINTMENT (OUTPATIENT)
Dept: PRIMARY CARE | Facility: CLINIC | Age: 35
End: 2025-03-31
Payer: COMMERCIAL

## 2025-03-31 VITALS
OXYGEN SATURATION: 98 % | DIASTOLIC BLOOD PRESSURE: 82 MMHG | HEIGHT: 70 IN | WEIGHT: 192 LBS | TEMPERATURE: 98.1 F | SYSTOLIC BLOOD PRESSURE: 122 MMHG | RESPIRATION RATE: 16 BRPM | BODY MASS INDEX: 27.49 KG/M2 | HEART RATE: 60 BPM

## 2025-03-31 DIAGNOSIS — M94.0 COSTOCHONDRITIS: ICD-10-CM

## 2025-03-31 DIAGNOSIS — R22.9 LOCAL SUPERFICIAL SWELLING, MASS OR LUMP: ICD-10-CM

## 2025-03-31 DIAGNOSIS — Z86.39 HISTORY OF VITAMIN D DEFICIENCY: ICD-10-CM

## 2025-03-31 DIAGNOSIS — M62.838 SPASM OF MUSCLE: ICD-10-CM

## 2025-03-31 DIAGNOSIS — Z00.00 ENCOUNTER FOR HEALTH MAINTENANCE EXAMINATION IN ADULT: Primary | ICD-10-CM

## 2025-03-31 PROCEDURE — 1036F TOBACCO NON-USER: CPT

## 2025-03-31 PROCEDURE — 3008F BODY MASS INDEX DOCD: CPT

## 2025-03-31 PROCEDURE — 99395 PREV VISIT EST AGE 18-39: CPT

## 2025-03-31 RX ORDER — CYCLOBENZAPRINE HCL 5 MG
5 TABLET ORAL NIGHTLY PRN
Qty: 30 TABLET | Refills: 2 | Status: SHIPPED | OUTPATIENT
Start: 2025-03-31 | End: 2025-05-30

## 2025-03-31 SDOH — ECONOMIC STABILITY: INCOME INSECURITY: IN THE LAST 12 MONTHS, WAS THERE A TIME WHEN YOU WERE NOT ABLE TO PAY THE MORTGAGE OR RENT ON TIME?: NO

## 2025-03-31 SDOH — HEALTH STABILITY: PHYSICAL HEALTH: ON AVERAGE, HOW MANY DAYS PER WEEK DO YOU ENGAGE IN MODERATE TO STRENUOUS EXERCISE (LIKE A BRISK WALK)?: 7 DAYS

## 2025-03-31 SDOH — HEALTH STABILITY: PHYSICAL HEALTH: ON AVERAGE, HOW MANY MINUTES DO YOU ENGAGE IN EXERCISE AT THIS LEVEL?: 30 MIN

## 2025-03-31 SDOH — ECONOMIC STABILITY: FOOD INSECURITY: WITHIN THE PAST 12 MONTHS, THE FOOD YOU BOUGHT JUST DIDN'T LAST AND YOU DIDN'T HAVE MONEY TO GET MORE.: NEVER TRUE

## 2025-03-31 SDOH — ECONOMIC STABILITY: FOOD INSECURITY: WITHIN THE PAST 12 MONTHS, YOU WORRIED THAT YOUR FOOD WOULD RUN OUT BEFORE YOU GOT MONEY TO BUY MORE.: NEVER TRUE

## 2025-03-31 ASSESSMENT — ENCOUNTER SYMPTOMS
EYE ITCHING: 0
SHORTNESS OF BREATH: 0
COUGH: 0
EYE REDNESS: 0
SEIZURES: 0
ABDOMINAL DISTENTION: 0
EYE PAIN: 0
FATIGUE: 0
FEVER: 0
MYALGIAS: 0
SINUS PAIN: 0
POLYDIPSIA: 0
FACIAL ASYMMETRY: 0
HEADACHES: 0
FREQUENCY: 0
STRIDOR: 0
DIFFICULTY URINATING: 0
NAUSEA: 0
AGITATION: 0
VOMITING: 0
POLYPHAGIA: 0
EYE DISCHARGE: 0
DIARRHEA: 0
ARTHRALGIAS: 0
APPETITE CHANGE: 0
SORE THROAT: 0
BACK PAIN: 0
WHEEZING: 0
DYSURIA: 0
CHILLS: 0
CHOKING: 0
CHEST TIGHTNESS: 0
NUMBNESS: 0
SINUS PRESSURE: 0
ABDOMINAL PAIN: 0
WOUND: 0
RHINORRHEA: 0
ACTIVITY CHANGE: 0
CONSTIPATION: 0
SLEEP DISTURBANCE: 0
LIGHT-HEADEDNESS: 0
DIZZINESS: 0
PALPITATIONS: 0

## 2025-03-31 ASSESSMENT — SOCIAL DETERMINANTS OF HEALTH (SDOH)
HOW OFTEN DO YOU GET TOGETHER WITH FRIENDS OR RELATIVES?: MORE THAN THREE TIMES A WEEK
WITHIN THE LAST YEAR, HAVE YOU BEEN KICKED, HIT, SLAPPED, OR OTHERWISE PHYSICALLY HURT BY YOUR PARTNER OR EX-PARTNER?: NO
HOW HARD IS IT FOR YOU TO PAY FOR THE VERY BASICS LIKE FOOD, HOUSING, MEDICAL CARE, AND HEATING?: NOT VERY HARD
IN A TYPICAL WEEK, HOW MANY TIMES DO YOU TALK ON THE PHONE WITH FAMILY, FRIENDS, OR NEIGHBORS?: MORE THAN THREE TIMES A WEEK
WITHIN THE LAST YEAR, HAVE TO BEEN RAPED OR FORCED TO HAVE ANY KIND OF SEXUAL ACTIVITY BY YOUR PARTNER OR EX-PARTNER?: NO
HOW OFTEN DO YOU ATTEND CHURCH OR RELIGIOUS SERVICES?: NEVER
WITHIN THE LAST YEAR, HAVE YOU BEEN AFRAID OF YOUR PARTNER OR EX-PARTNER?: NO
IN THE PAST 12 MONTHS, HAS THE ELECTRIC, GAS, OIL, OR WATER COMPANY THREATENED TO SHUT OFF SERVICE IN YOUR HOME?: NO
ARE YOU MARRIED, WIDOWED, DIVORCED, SEPARATED, NEVER MARRIED, OR LIVING WITH A PARTNER?: LIVING WITH PARTNER
HOW OFTEN DO YOU ATTENT MEETINGS OF THE CLUB OR ORGANIZATION YOU BELONG TO?: NEVER
DO YOU BELONG TO ANY CLUBS OR ORGANIZATIONS SUCH AS CHURCH GROUPS UNIONS, FRATERNAL OR ATHLETIC GROUPS, OR SCHOOL GROUPS?: NO
WITHIN THE LAST YEAR, HAVE YOU BEEN HUMILIATED OR EMOTIONALLY ABUSED IN OTHER WAYS BY YOUR PARTNER OR EX-PARTNER?: NO

## 2025-03-31 ASSESSMENT — LIFESTYLE VARIABLES
HOW OFTEN DO YOU HAVE A DRINK CONTAINING ALCOHOL: MONTHLY OR LESS
HOW MANY STANDARD DRINKS CONTAINING ALCOHOL DO YOU HAVE ON A TYPICAL DAY: 1 OR 2
HOW OFTEN DO YOU HAVE SIX OR MORE DRINKS ON ONE OCCASION: NEVER
AUDIT-C TOTAL SCORE: 1
SKIP TO QUESTIONS 9-10: 1

## 2025-03-31 ASSESSMENT — PATIENT HEALTH QUESTIONNAIRE - PHQ9
2. FEELING DOWN, DEPRESSED OR HOPELESS: NOT AT ALL
1. LITTLE INTEREST OR PLEASURE IN DOING THINGS: NOT AT ALL
SUM OF ALL RESPONSES TO PHQ9 QUESTIONS 1 & 2: 0

## 2025-03-31 NOTE — PROGRESS NOTES
"Christiana Pardo is a 34 y.o. male who presents for Annual Exam.      Patient states that he is overall doing well, recently moved to [place] few months ago.  He states he stopped taking Zoloft over the past year, is doing well and no issues with anxiety.  He does still continue to have pectoralis spasm.  This briefly went away when he was doing regular exercise but states this has since come back when he stopped exercising regularly.  He formally had a popping sensation up to his sternum but has not had this recently.  He denies any other joint pain.  Lumbar x-rays and rheumatology workup were negative in the past.  He has been drinking 120 ounces of water per day.  He was previously having issues with new onset constipation, scheduled for colonoscopy but this was never done.  He states that his stools have returned to normal since then.  Pt also reports a mobile mass to left lower back which has stayed the same for the last 10 years.         Screening:   Hep C: neg  HIV: neg  PHQ-2: negative    Immunizations:   Tdap: UTD       Diet: \"horrible, lot of junk food, processed foods\" Fast food - A few times per month if that; sugar sweetened beverages - No  Exercise: No specific regimen   Sleep: \"okay\", 6-8 hours per night, does not snore      Review of Systems   Constitutional:  Negative for activity change, appetite change, chills, fatigue and fever.   HENT:  Negative for congestion, dental problem, ear pain, mouth sores, postnasal drip, rhinorrhea, sinus pressure, sinus pain, sneezing and sore throat.    Eyes:  Negative for pain, discharge, redness and itching.   Respiratory:  Negative for cough, choking, chest tightness, shortness of breath, wheezing and stridor.    Cardiovascular:  Negative for chest pain, palpitations and leg swelling.   Gastrointestinal:  Negative for abdominal distention, abdominal pain, constipation, diarrhea, nausea and vomiting.   Endocrine: Negative for polydipsia, " "polyphagia and polyuria.   Genitourinary:  Negative for decreased urine volume, difficulty urinating, dysuria, enuresis, frequency and urgency.   Musculoskeletal:  Negative for arthralgias, back pain and myalgias.   Skin:  Negative for pallor, rash and wound.   Neurological:  Negative for dizziness, seizures, syncope, facial asymmetry, light-headedness, numbness and headaches.   Psychiatric/Behavioral:  Negative for agitation, behavioral problems, sleep disturbance and suicidal ideas.        Objective   /82 (BP Location: Left arm, Patient Position: Sitting, BP Cuff Size: Adult)   Pulse 60   Temp 36.7 °C (98.1 °F)   Resp 16   Ht 1.778 m (5' 10\")   Wt 87.1 kg (192 lb)   SpO2 98%   BMI 27.55 kg/m²    Physical Exam  Vitals and nursing note reviewed.   Constitutional:       General: He is not in acute distress.     Appearance: Normal appearance. He is not ill-appearing or toxic-appearing.   HENT:      Head: Normocephalic and atraumatic.      Right Ear: External ear normal.      Left Ear: External ear normal.      Nose: Nose normal.      Mouth/Throat:      Mouth: Mucous membranes are moist.   Eyes:      Extraocular Movements: Extraocular movements intact.   Cardiovascular:      Rate and Rhythm: Normal rate and regular rhythm.   Pulmonary:      Effort: Pulmonary effort is normal. No respiratory distress.      Breath sounds: Normal breath sounds. No stridor. No wheezing or rhonchi.   Abdominal:      General: Abdomen is flat.      Tenderness: There is no abdominal tenderness. There is no guarding or rebound.   Musculoskeletal:         General: Normal range of motion.        Arms:       Comments: Small 1 cm mobile mass to left lumbar paraspinal region    Skin:     General: Skin is warm and dry.      Findings: No rash.   Neurological:      General: No focal deficit present.      Mental Status: He is alert and oriented to person, place, and time.         Assessment/Plan   Assessment & Plan  Spasm of " muscle    Orders:    Referral to Physical Therapy; Future    Encounter for health maintenance examination in adult    Orders:    CBC; Future    Comprehensive metabolic panel; Future    Tsh With Reflex To Free T4 If Abnormal; Future    Lipid panel; Future    History of vitamin D deficiency    Orders:    Vitamin D 25-Hydroxy,Total (for eval of Vitamin D levels); Future    Costochondritis  - Discussed PRN NSAIDs and physical therapy   Orders:    cyclobenzaprine (Flexeril) 5 mg tablet; Take 1 tablet (5 mg) by mouth as needed at bedtime for muscle spasms.    Local superficial swelling, mass or lump  - Suspect that this could be a lipoma, will check ultrasound   Orders:    US extremity nonvascular real time w image documentation limited anatomic specific; Future       Basic labs ordered along with PT. UTD on immunizations and screenings.     Follow up after PT.   Discussed with attending physician Dr. Snow.       Gordy Moralez DO

## 2025-03-31 NOTE — PROGRESS NOTES
I reviewed the resident/fellow's documentation and discussed the patient with the resident. I agree with the resident medical decision making as documented in the note.   Patient is in few this issues for some time now.  May need to remove possible lipoma.  Patient's already had cardiac workup in the past.  He did well physical therapy and exercise.  Will see if this helps his costochondritis.  May need to see Ortho  If any chest pain shortness of breath any nausea vomiting or fever headache any concerning symptoms go to ER  Follow-up in 1 month  Agree with assessment and plan  Jerardo Snow, DO

## 2025-03-31 NOTE — PATIENT INSTRUCTIONS
Thad,     Glad to hear you are doing well.     Please get labs at your convenience.     Give physical therapy a try for chest spasm.     For the lump in the low back - call [phone] to schedule the ultrasound.

## 2025-03-31 NOTE — ASSESSMENT & PLAN NOTE
- Discussed PRN NSAIDs and physical therapy   Orders:    cyclobenzaprine (Flexeril) 5 mg tablet; Take 1 tablet (5 mg) by mouth as needed at bedtime for muscle spasms.

## 2025-04-01 LAB
ALBUMIN SERPL-MCNC: 4.9 G/DL (ref 3.6–5.1)
ALP SERPL-CCNC: 40 U/L (ref 36–130)
ALT SERPL-CCNC: 11 U/L (ref 9–46)
ANION GAP SERPL CALCULATED.4IONS-SCNC: 10 MMOL/L (CALC) (ref 7–17)
AST SERPL-CCNC: 16 U/L (ref 10–40)
BILIRUB SERPL-MCNC: 0.5 MG/DL (ref 0.2–1.2)
BUN SERPL-MCNC: 13 MG/DL (ref 7–25)
CALCIUM SERPL-MCNC: 9.7 MG/DL (ref 8.6–10.3)
CHLORIDE SERPL-SCNC: 102 MMOL/L (ref 98–110)
CHOLEST SERPL-MCNC: 211 MG/DL
CHOLEST/HDLC SERPL: 6.4 (CALC)
CO2 SERPL-SCNC: 27 MMOL/L (ref 20–32)
CREAT SERPL-MCNC: 0.93 MG/DL (ref 0.6–1.26)
EGFRCR SERPLBLD CKD-EPI 2021: 111 ML/MIN/1.73M2
ERYTHROCYTE [DISTWIDTH] IN BLOOD BY AUTOMATED COUNT: 12.2 % (ref 11–15)
GLUCOSE SERPL-MCNC: 82 MG/DL (ref 65–139)
HCT VFR BLD AUTO: 45.2 % (ref 38.5–50)
HDLC SERPL-MCNC: 33 MG/DL
HGB BLD-MCNC: 15.3 G/DL (ref 13.2–17.1)
LDLC SERPL CALC-MCNC: 151 MG/DL (CALC)
MCH RBC QN AUTO: 29.7 PG (ref 27–33)
MCHC RBC AUTO-ENTMCNC: 33.8 G/DL (ref 32–36)
MCV RBC AUTO: 87.8 FL (ref 80–100)
NONHDLC SERPL-MCNC: 178 MG/DL (CALC)
PLATELET # BLD AUTO: 228 THOUSAND/UL (ref 140–400)
PMV BLD REES-ECKER: 9.9 FL (ref 7.5–12.5)
POTASSIUM SERPL-SCNC: 4.2 MMOL/L (ref 3.5–5.3)
PROT SERPL-MCNC: 7.8 G/DL (ref 6.1–8.1)
RBC # BLD AUTO: 5.15 MILLION/UL (ref 4.2–5.8)
SODIUM SERPL-SCNC: 139 MMOL/L (ref 135–146)
TRIGL SERPL-MCNC: 138 MG/DL
TSH SERPL-ACNC: 2.39 MIU/L (ref 0.4–4.5)
WBC # BLD AUTO: 6.2 THOUSAND/UL (ref 3.8–10.8)

## 2025-04-09 ENCOUNTER — APPOINTMENT (OUTPATIENT)
Dept: UROLOGY | Facility: CLINIC | Age: 35
End: 2025-04-09
Payer: COMMERCIAL